# Patient Record
Sex: MALE | Race: WHITE | HISPANIC OR LATINO | URBAN - METROPOLITAN AREA
[De-identification: names, ages, dates, MRNs, and addresses within clinical notes are randomized per-mention and may not be internally consistent; named-entity substitution may affect disease eponyms.]

---

## 2021-04-25 ENCOUNTER — EMERGENCY (EMERGENCY)
Facility: HOSPITAL | Age: 66
LOS: 1 days | Discharge: HOME | End: 2021-04-25
Attending: EMERGENCY MEDICINE
Payer: SELF-PAY

## 2021-04-25 VITALS
OXYGEN SATURATION: 98 % | DIASTOLIC BLOOD PRESSURE: 70 MMHG | RESPIRATION RATE: 18 BRPM | SYSTOLIC BLOOD PRESSURE: 118 MMHG | TEMPERATURE: 97 F | HEART RATE: 75 BPM

## 2021-04-25 PROCEDURE — 99283 EMERGENCY DEPT VISIT LOW MDM: CPT

## 2021-04-25 PROCEDURE — 99284 EMERGENCY DEPT VISIT MOD MDM: CPT

## 2021-04-25 PROCEDURE — 82962 GLUCOSE BLOOD TEST: CPT

## 2021-04-25 PROCEDURE — 99053 MED SERV 10PM-8AM 24 HR FAC: CPT

## 2021-04-25 RX ORDER — SODIUM CHLORIDE 9 MG/ML
3 INJECTION INTRAMUSCULAR; INTRAVENOUS; SUBCUTANEOUS ONCE
Refills: 0 | Status: DISCONTINUED | OUTPATIENT
Start: 2021-04-25 | End: 2021-04-25

## 2021-04-25 NOTE — ED PROVIDER NOTE - CLINICAL SUMMARY MEDICAL DECISION MAKING FREE TEXT BOX
66 y/o male, asymptomatic and with no complaints, requesting only to eat. Will provide sources for shelter. 66 y/o male, asymptomatic and with no complaints, requesting only to eat. Will provide sources for shelter.  Pt is well appearing, has no new complaints and able to walk with normal gait. Pt is stable for discharge and follow up with medical doctor. Pt educated on care and need for follow up. Discussed anticipatory guidance and return precautions. Questions answered. I had a detailed discussion with the patient and/or guardian regarding the historical points, exam findings, and any diagnostic results supporting the discharge diagnosis.

## 2021-04-25 NOTE — ED ADULT NURSE NOTE - CHPI ED NUR TIMING2
Consent 1/Introductory Paragraph: The rationale for Mohs was explained to the patient and consent was obtained. The risks, benefits and alternatives to therapy were discussed in detail. Specifically, the risks of infection, scarring, bleeding, prolonged wound healing, incomplete removal, allergy to anesthesia, nerve injury and recurrence were addressed. Prior to the procedure, the treatment site was clearly identified and confirmed by the patient. All components of Universal Protocol/PAUSE Rule completed. none

## 2021-04-25 NOTE — ED PROVIDER NOTE - NSFOLLOWUPCLINICS_GEN_ALL_ED_FT
[facility]  Internal Medicine  [address] [place] [address]  Phone: [phone]  Fax: [phone]

## 2021-04-25 NOTE — ED PROVIDER NOTE - NSCAREINITIATED _GEN_ER
Problem: Oxygenation:  Goal: Maintain adequate oxygenation dependent on patient condition  Outcome: NOT MET     Problem: Bronchoconstriction:  Goal: Improve in air movement and diminished wheezing  Outcome: NOT MET     Problem: Ventilation Defect:  Goal: Ability to achieve and maintain unassisted ventilation or tolerate decreased levels of ventilator support  Outcome: MET      Patient extubated to nasal cannula today.     Given racemic epi x 2 for stridor post extubation and placed on cool aerosol.      Jp Root(Attending)

## 2021-04-25 NOTE — ED ADULT NURSE NOTE - OBJECTIVE STATEMENT
pt is  65 [place]/o generalized weakness and feeling cold.denies any nausea and vomiting.

## 2021-04-25 NOTE — ED PROVIDER NOTE - NSFOLLOWUPINSTRUCTIONS_ED_ALL_ED_FT
Return to ER if you have pain, fever, vomiting, feel depressed, anxious or unsafe. See contact information for detox facility. If you need any assistance for appointments please contact our Care Coordinator at [phone].

## 2021-04-25 NOTE — ED PROVIDER NOTE - PATIENT PORTAL LINK FT
You can access the FollowMyHealth Patient Portal offered by [facility] by registering at the following website: [email or web address]. By joining [employer]’s FollowMyHealth portal, you will also be able to view your health information using other applications (apps) compatible with our system.

## 2021-04-25 NOTE — ED PROVIDER NOTE - CONSTITUTIONAL, MLM
Appears disheveled, well appearing, awake, alert, oriented to person, place, time/situation and in no apparent distress. normal...

## 2022-05-07 ENCOUNTER — EMERGENCY (EMERGENCY)
Facility: HOSPITAL | Age: 67
LOS: 1 days | Discharge: HOME | End: 2022-05-07
Attending: EMERGENCY MEDICINE | Admitting: EMERGENCY MEDICINE
Payer: SELF-PAY

## 2022-05-07 VITALS
OXYGEN SATURATION: 98 % | WEIGHT: 160.06 LBS | RESPIRATION RATE: 16 BRPM | HEART RATE: 70 BPM | DIASTOLIC BLOOD PRESSURE: 88 MMHG | TEMPERATURE: 99 F | SYSTOLIC BLOOD PRESSURE: 142 MMHG

## 2022-05-07 PROCEDURE — 99284 EMERGENCY DEPT VISIT MOD MDM: CPT

## 2022-05-07 RX ORDER — IBUPROFEN 200 MG
600 TABLET ORAL ONCE
Refills: 0 | Status: COMPLETED | OUTPATIENT
Start: 2022-05-07 | End: 2022-05-07

## 2022-05-07 RX ORDER — ACETAMINOPHEN 500 MG
650 TABLET ORAL ONCE
Refills: 0 | Status: COMPLETED | OUTPATIENT
Start: 2022-05-07 | End: 2022-05-07

## 2022-05-07 RX ADMIN — Medication 650 MILLIGRAM(S): at 14:10

## 2022-05-07 RX ADMIN — Medication 600 MILLIGRAM(S): at 14:11

## 2022-05-07 NOTE — ED PROVIDER NOTE - OBJECTIVE STATEMENT
67 yo male pt, undomiciled, presents requesting food and stating he is hungry. Initially complaining of abd pain, nausea, vomiting but then changed complain.

## 2022-05-07 NOTE — ED ADULT TRIAGE NOTE - CHIEF COMPLAINT QUOTE
BIBEMS for left above the knee amputation site pain, pt states "it (left leg) was sawed off 5 months ago at [facility]" not able to explain further details. Site is warm , some sutures appear to still be in place unclear if they are intact.  Pt arrived with own wheelchair.

## 2022-05-07 NOTE — ED PROVIDER NOTE - PATIENT PORTAL LINK FT
You can access the FollowMyHealth Patient Portal offered by [facility] by registering at the following website: [email or web address]. By joining [employer]’s FollowMyHealth portal, you will also be able to view your health information using other applications (apps) compatible with our system.

## 2022-05-07 NOTE — ED BEHAVIORAL HEALTH NOTE - BEHAVIORAL HEALTH NOTE
SW was consulted to the ED by Provider to assist PT with shelter resources.  PT was provided with shelter and drop in center information.  Team made aware and SW made available for further assistance.

## 2022-05-07 NOTE — ED ADULT NURSE NOTE - CAS DISC TRANSFER ENTER DETAILS FT
Down 10lb since last seen. Started Qsymia 4 weeks ago. Decreased thoughts about food and hunger. Decreased urges to eat at night. Does feel more hungry during the day when normally she wouldn't be hungry all day. Eating more regular meals and snacks which is helping. Has eliminated soda. Experiencing some tingling, not bothersome. Insomnia since starting qsymia- can fall asleep but not stay asleep. Will try stopping the qsymia and switching to topiramate only- start at 50mg in the evening and increase to 75mg in the evening. She will try this for a couple of weeks. Could consider the 8mg phentermine (not controlled release like qsymia) if too hungry on topiramate alone.    wheelchair

## 2022-05-07 NOTE — ED ADULT NURSE NOTE - OBJECTIVE STATEMENT
Pt aox3 BIBEMS, Pt states left above the knee amputation site pain, pt states "it (left leg) was sawed off 5 months ago at [facility]" not able to explain further details. Site is warm , some sutures appear to still be in place unclear if they are intact.  Pt arrived with own wheelchair. denies fevers or drainage.

## 2022-05-07 NOTE — ED ADULT NURSE NOTE - NSIMPLEMENTINTERV_GEN_ALL_ED
Implemented All Fall Risk Interventions:  [place] to call system. Call bell, personal items and telephone within reach. Instruct patient to call for assistance. Room bathroom lighting operational. Non-slip footwear when patient is off stretcher. Physically safe environment: no spills, clutter or unnecessary equipment. Stretcher in lowest position, wheels locked, appropriate side rails in place. Provide visual cue, wrist band, yellow gown, etc. Monitor gait and stability. Monitor for mental status changes and reorient to person, place, and time. Review medications for side effects contributing to fall risk. Reinforce activity limits and safety measures with patient and family.

## 2022-05-07 NOTE — ED ADULT NURSE NOTE - CHIEF COMPLAINT QUOTE
BIBEMS for left above the knee amputation site pain, pt states "it (left leg) was sawed off 5 months ago at [facility]" not able to explain further details. Site is warm , some sutures appear to still be in place unclear if they are intact.  Pt arrived with own wheelchair.

## 2022-05-08 ENCOUNTER — EMERGENCY (EMERGENCY)
Facility: HOSPITAL | Age: 67
LOS: 1 days | Discharge: HOME | End: 2022-05-08
Attending: EMERGENCY MEDICINE | Admitting: EMERGENCY MEDICINE
Payer: SELF-PAY

## 2022-05-08 VITALS
HEIGHT: 67 IN | RESPIRATION RATE: 18 BRPM | DIASTOLIC BLOOD PRESSURE: 88 MMHG | WEIGHT: 139.99 LBS | HEART RATE: 77 BPM | TEMPERATURE: 98 F | OXYGEN SATURATION: 98 % | SYSTOLIC BLOOD PRESSURE: 130 MMHG

## 2022-05-08 VITALS
SYSTOLIC BLOOD PRESSURE: 94 MMHG | HEART RATE: 94 BPM | WEIGHT: 160.06 LBS | RESPIRATION RATE: 18 BRPM | OXYGEN SATURATION: 98 % | DIASTOLIC BLOOD PRESSURE: 63 MMHG | TEMPERATURE: 98 F

## 2022-05-08 DIAGNOSIS — R10.9 UNSPECIFIED ABDOMINAL PAIN: ICD-10-CM

## 2022-05-08 DIAGNOSIS — Z59.48 OTHER SPECIFIED LACK OF ADEQUATE FOOD: ICD-10-CM

## 2022-05-08 DIAGNOSIS — Z59.01 SHELTERED HOMELESSNESS: ICD-10-CM

## 2022-05-08 PROCEDURE — 99284 EMERGENCY DEPT VISIT MOD MDM: CPT

## 2022-05-08 SDOH — ECONOMIC STABILITY - FOOD INSECURITY: OTHER SPECIFIED LACK OF ADEQUATE FOOD: Z59.48

## 2022-05-08 SDOH — ECONOMIC STABILITY - HOUSING INSECURITY: SHELTERED HOMELESSNESS: Z59.01

## 2022-05-08 NOTE — ED ADULT NURSE NOTE - BEFAST ARM NUMBNESS
Met with patient and wife about diagnosis and discharge plan of care. Pt lives with spouse in 2 story home with bed and bath on 1 st floor. Has [employer], will get commode and shower chair at home. Pt lives approx 2 hours away in [place]. [facility]-will contact.     Addend note: faxed orders and noted to [facility] nurses-SOLITARIO No

## 2022-05-08 NOTE — ED PROVIDER NOTE - CLINICAL SUMMARY MEDICAL DECISION MAKING FREE TEXT BOX
Patient with leg amputation brought from [address] by EMS after he flagged down a  and requested transport to hospital for body pain, on presentation to ED patient states he needs food and a place to rest.

## 2022-05-08 NOTE — ED PROVIDER NOTE - PATIENT PORTAL LINK FT
You can access the FollowMyHealth Patient Portal offered by [facility] by registering at the following website: [email or web address]. By joining [employer]’s FollowMyHealth portal, you will also be able to view your health information using other applications (apps) compatible with our system.

## 2022-05-08 NOTE — ED PROVIDER NOTE - CLINICAL SUMMARY MEDICAL DECISION MAKING FREE TEXT BOX
provided food. medical screening examination shows no findings to suggest life threatening illnesses.

## 2022-05-08 NOTE — ED PROVIDER NOTE - PATIENT PORTAL LINK FT
You can access the FollowMyHealth Patient Portal offered by [facility] by registering at the following website: [email or web address]. By joining [employer]’s FollowMyHealth portal, you will also be able to view your health information using other applications (apps) compatible with our system.

## 2022-05-08 NOTE — ED ADULT NURSE NOTE - OBJECTIVE STATEMENT
pt a&ox3 BIBA c/o body pain for 2 weeks. was just DCed from here recently. denies trauma. has L. AKA, uses wheelchair for mobility. will continue to monitor.

## 2022-05-08 NOTE — ED PROVIDER NOTE - OBJECTIVE STATEMENT
67 yo male pt, undomiciled, presents requesting food and stating he is hungry. Initially complaining of malaise, body aches for several months. Old chart review shows prior visit yesterday for similar.

## 2022-05-08 NOTE — ED ADULT TRIAGE NOTE - CHIEF COMPLAINT QUOTE
Pt BIBEMS from street complaining of body aches x 2 months. PT with RAMIRO TURCIOS. Pt requesting food in triage.

## 2022-05-08 NOTE — ED ADULT TRIAGE NOTE - CHIEF COMPLAINT QUOTE
Pt BIBA c/o body pain for 2 wks. Pt D/C from this facility 2 hours ago. Pt denies any new injury/trauma. Pt has L AKA, uses wheelchair for mobility.

## 2022-05-08 NOTE — ED PROVIDER NOTE - OBJECTIVE STATEMENT
Patient with leg amputation brought from [address] by EMS after he flagged down a  and requested transport to hospital for body pain, on presentation to ED patient states he needs food and a place to rest.

## 2022-05-10 DIAGNOSIS — Z89.9 ACQUIRED ABSENCE OF LIMB, UNSPECIFIED: ICD-10-CM

## 2022-05-10 DIAGNOSIS — Z00.00 ENCOUNTER FOR GENERAL ADULT MEDICAL EXAMINATION WITHOUT ABNORMAL FINDINGS: ICD-10-CM

## 2022-05-10 DIAGNOSIS — R53.81 OTHER MALAISE: ICD-10-CM

## 2022-05-10 DIAGNOSIS — R53.1 WEAKNESS: ICD-10-CM

## 2022-05-10 DIAGNOSIS — R52 PAIN, UNSPECIFIED: ICD-10-CM

## 2022-05-14 ENCOUNTER — EMERGENCY (EMERGENCY)
Facility: HOSPITAL | Age: 67
LOS: 1 days | Discharge: HOME | End: 2022-05-14
Admitting: EMERGENCY MEDICINE
Payer: SELF-PAY

## 2022-05-14 VITALS
SYSTOLIC BLOOD PRESSURE: 100 MMHG | HEART RATE: 72 BPM | HEIGHT: 67 IN | OXYGEN SATURATION: 95 % | DIASTOLIC BLOOD PRESSURE: 65 MMHG | TEMPERATURE: 99 F | RESPIRATION RATE: 18 BRPM

## 2022-05-14 PROBLEM — Z89.619 ACQUIRED ABSENCE OF UNSPECIFIED LEG ABOVE KNEE: Chronic | Status: ACTIVE | Noted: 2022-05-08

## 2022-05-14 PROCEDURE — 99284 EMERGENCY DEPT VISIT MOD MDM: CPT

## 2022-05-14 PROCEDURE — 99053 MED SERV 10PM-8AM 24 HR FAC: CPT

## 2022-05-14 NOTE — ED PROVIDER NOTE - PATIENT PORTAL LINK FT
You can access the FollowMyHealth Patient Portal offered by [facility] by registering at the following website: [email or web address]. By joining [employer]’s FollowMyHealth portal, you will also be able to view your health information using other applications (apps) compatible with our system.

## 2022-05-14 NOTE — ED PROVIDER NOTE - CLINICAL SUMMARY MEDICAL DECISION MAKING FREE TEXT BOX
65 y/o M presents to ED c/o generalized body aches.  Pt well appearing, VSS, NAD.  Pt sleeping in treatment chair.  He is refusing medications offered and requesting a bed to lay down.  Pt does not endorse any other symptoms.  Symptoms are possible early viral syndrome vs malingering behavior.    Return precautions discussed.  Pt verbalized understanding and given the opportunity to ask questions.  Patient advised to follow up with primary care provider.

## 2022-05-14 NOTE — ED PROVIDER NOTE - CONDITION AT DISCHARGE:
acetaminophen-codeine (TYLENOL #3) 300-30 MG tablet      Last Written Prescription Date:  6/10/2019  Last Fill Quantity: 90 tablet,   # refills: 0  Last Office Visit: 4/1/2019  magda/ JIMMY Graves    Future Office visit:    Next 5 appointments (look out 90 days)    Jul 22, 2019  1:00 PM CDT  [facility] [address] [place] [phone]           Routing refill request to provider for review/approval because:  Drug not on the [facility], [facility] or  Health refill protocol or controlled substance     Improved

## 2022-05-14 NOTE — ED PROVIDER NOTE - PHYSICAL EXAMINATION
Constitutional:  Well appearing, awake, alert, oriented to person, place, time/situation and in no apparent distress  Head:  NC/AT, symmetric  ENMT: Airway patent  Eyes:  Clear bilaterally, pupils equal, round   Cardiac:  Normal rate  Respiratory:  Normal respiratory rate and effort  GI:   Abd soft, non-distended  MSK:  Atraumatic, FROM  Neuro:  Alert and oriented  Skin:  Skin normal color for race, warm, dry and intact.    Psych:  Normal mood and affect, no apparent risk to self or others.

## 2022-05-14 NOTE — ED PROVIDER NOTE - NSFOLLOWUPINSTRUCTIONS_ED_ALL_ED_FT
-PLEASE FOLLOW-UP WITH YOUR PRIMARY CARE DOCTOR IN 1-2 DAYS.  BRING ALL PAPERWORK FROM TODAY'S VISIT TO YOUR FOLLOW-UP VISIT.  IF YOU DO NOT HAVE A PRIMARY CARE DOCTOR PLEASE REFER TO THE OFFICE/CLINIC INFORMATION GIVEN ABOVE.  YOU MAY ALSO CALL [phone] AND ASK FOR MS. AMANDEEP HANANE.  SHE CAN HELP YOU MAKE A FOLLOW-UP APPOINTMENT.  HER HOURS ARE 9AM-5PM MONDAY - FRIDAY.  -TAKE OVER THE COUNTER TYLENOL 650MG BY MOUTH EVERY 4-6 HOURS AS NEEDED FOR PAIN.  DO NOT MIX WITH ALCOHOL OR OTHER PRESCRIPTION MEDICATIONS THAT ALREADY CONTAIN TYLENOL OR ACETAMINOPHEN.   -TAKE OVER THE COUNTER IBUPROFEN 400-600MG BY MOUTH EVERY 8 HOURS AS NEEDED FOR PAIN.  BE SURE TO TAKE WITH FOOD OR MILK AS THIS MEDICATION CAN CAUSE STOMACH IRRITATION.  -PLEASE RETURN TO THE EMERGENCY DEPARTMENT IMMEDIATELY OR CALL 911 FOR ANY HIGH FEVER, TROUBLE BREATHING, VOMITING, SEVERE PAIN, OR ANY OTHER CONCERNS.

## 2022-05-15 ENCOUNTER — EMERGENCY (EMERGENCY)
Facility: HOSPITAL | Age: 67
LOS: 1 days | Discharge: HOME | End: 2022-05-15
Admitting: EMERGENCY MEDICINE
Payer: SELF-PAY

## 2022-05-15 VITALS
HEIGHT: 67 IN | OXYGEN SATURATION: 97 % | HEART RATE: 78 BPM | DIASTOLIC BLOOD PRESSURE: 64 MMHG | TEMPERATURE: 98 F | SYSTOLIC BLOOD PRESSURE: 104 MMHG | WEIGHT: 139.99 LBS | RESPIRATION RATE: 16 BRPM

## 2022-05-15 VITALS
SYSTOLIC BLOOD PRESSURE: 118 MMHG | DIASTOLIC BLOOD PRESSURE: 77 MMHG | RESPIRATION RATE: 15 BRPM | HEIGHT: 67 IN | TEMPERATURE: 99 F | OXYGEN SATURATION: 98 % | WEIGHT: 147.05 LBS | HEART RATE: 78 BPM

## 2022-05-15 VITALS
RESPIRATION RATE: 18 BRPM | TEMPERATURE: 98 F | HEART RATE: 65 BPM | HEIGHT: 67 IN | DIASTOLIC BLOOD PRESSURE: 70 MMHG | OXYGEN SATURATION: 97 % | WEIGHT: 139.99 LBS | SYSTOLIC BLOOD PRESSURE: 102 MMHG

## 2022-05-15 DIAGNOSIS — M79.10 MYALGIA, UNSPECIFIED SITE: ICD-10-CM

## 2022-05-15 DIAGNOSIS — Z59.00 HOMELESSNESS UNSPECIFIED: ICD-10-CM

## 2022-05-15 PROCEDURE — 99282 EMERGENCY DEPT VISIT SF MDM: CPT

## 2022-05-15 PROCEDURE — 99283 EMERGENCY DEPT VISIT LOW MDM: CPT

## 2022-05-15 PROCEDURE — 99284 EMERGENCY DEPT VISIT MOD MDM: CPT

## 2022-05-15 PROCEDURE — 99053 MED SERV 10PM-8AM 24 HR FAC: CPT

## 2022-05-15 RX ORDER — ACETAMINOPHEN 500 MG
650 TABLET ORAL ONCE
Refills: 0 | Status: DISCONTINUED | OUTPATIENT
Start: 2022-05-15 | End: 2022-05-18

## 2022-05-15 SDOH — ECONOMIC STABILITY - HOUSING INSECURITY: HOMELESSNESS UNSPECIFIED: Z59.00

## 2022-05-15 NOTE — ED PROVIDER NOTE - CLINICAL SUMMARY MEDICAL DECISION MAKING FREE TEXT BOX
medical screening exam has been performed.  Pt with no acute trauma or emergencies noted and exam wnl.  hemodynamically stable and non toxic appearing, given food and medically stable for dc. f/u instructions have been provided

## 2022-05-15 NOTE — ED PROVIDER NOTE - PHYSICAL EXAMINATION
Gen - Unkempt M, NAD, comfortable and non-toxic appearing  Skin - warm, dry, intact   HEENT - AT/NC, no nasal discharge, airway patent, neck supple and FROM  MS - No acute or gross deformities noted to extremities. s/p L AKA, stump C/D, wheelchair bound   Neuro - AxOx3, normal speech
ROLLING WALKER

## 2022-05-15 NOTE — ED PROVIDER NOTE - PHYSICAL EXAMINATION
Gen - Unkempt M, NAD, comfortable and non-toxic appearing  Skin - warm, dry, intact   HEENT - AT/NC, no nasal discharge, airway patent, neck supple and FROM  MS - No acute or gross deformities noted to extremities. s/p L AKA, stump C/D, wheelchair bound   Neuro - AxOx3, normal speech

## 2022-05-15 NOTE — ED PROVIDER NOTE - OBJECTIVE STATEMENT
65 y/o M with PMH Of traumatic AKA presents returns to ED c/o myalgia x 5 years.  Pt states the symptoms are similar to the past and unchanged.  He is undomiciled and is requesting Tylenol and an opportunity to rest in the ED.

## 2022-05-15 NOTE — ED PROVIDER NOTE - OBJECTIVE STATEMENT
67 yo M with PMHx of L AKA, wheelchair bound, undomiciled, presenting c/o feeling tired due to lack of sleep. Seen here this morning for similar issues, and returns now asking for a bed to lay down.  Denies fever, chills, trauma, fall, CP, SOB, palpitations, N/V, HA, dizziness, focal weakness, change in urinary/bowel function, LOC, and malaise.

## 2022-05-15 NOTE — ED PROVIDER NOTE - PATIENT PORTAL LINK FT
You can access the FollowMyHealth Patient Portal offered by [facility] by registering at the following website: [email or web address]. By joining [employer]’s FollowMyHealth portal, you will also be able to view your health information using other applications (apps) compatible with our system.

## 2022-05-15 NOTE — ED PROVIDER NOTE - CLINICAL SUMMARY MEDICAL DECISION MAKING FREE TEXT BOX
67 y/o M presents to ED c/o chronic, unchanged, myalgia.  pt well appearing, afebrile, VSS.  Pt given Tylenol and food as requested.  Pt discharged.

## 2022-05-15 NOTE — ED ADULT TRIAGE NOTE - CHIEF COMPLAINT QUOTE
BIBA c/o myalgias x 5 months. seen yesterday at [facility] ED for same complaint. pt appears very comfortable. wheelchair at bedside

## 2022-05-15 NOTE — ED ADULT NURSE NOTE - CHIEF COMPLAINT QUOTE
BIBA c/o myalgias x 5 months. seen yesterday at [facility] ED for same complaint. pt appears very comfortable. wheelchair at bedside

## 2022-05-15 NOTE — ED PROVIDER NOTE - PATIENT PORTAL LINK FT
You can access the FollowMyHealth Patient Portal offered by [facility] by registering at the following website: [email or web address]. By joining [employer]’s FollowMyHealth portal, you will also be able to view your health information using other applications (apps) compatible with our system.

## 2022-05-15 NOTE — ED ADULT TRIAGE NOTE - SPO2 (%)
Called patient back.  She is planing to take a taxi tomorrow to her appointment.  For tonight she will try hydroxyzine  mg, and also use it throughout the day if needed for anxiety.   97

## 2022-05-15 NOTE — ED ADULT TRIAGE NOTE - CHIEF COMPLAINT QUOTE
BIBA for body aches x several months. was seen this morning for same complaint. pt appears very comfortable. wheelchair at bedside

## 2022-05-15 NOTE — ED PROVIDER NOTE - NSFOLLOWUPINSTRUCTIONS_ED_ALL_ED_FT
Follow up with your primary care doctor or clinics listed below if you do not have a doctor,    [address] [address]  To make an appointment, call [phone]    [facility]  Address: [facility] [address] [place] [id number]  Appointment Center: [phone] ([phone])     [facility] is a good referral line for crisis and substance abuse help.  [facility] has drop in programs all over the city.    Return to the ER for Emergencies.  Return immediately for any new or worsening symptoms or any new concerns

## 2022-05-15 NOTE — ED ADULT NURSE NOTE - NSIMPLEMENTINTERV_GEN_ALL_ED
Implemented All Fall with Harm Risk Interventions:  [place] to call system. Call bell, personal items and telephone within reach. Instruct patient to call for assistance. Room bathroom lighting operational. Non-slip footwear when patient is off stretcher. Physically safe environment: no spills, clutter or unnecessary equipment. Stretcher in lowest position, wheels locked, appropriate side rails in place. Provide visual cue, wrist band, yellow gown, etc. Monitor gait and stability. Monitor for mental status changes and reorient to person, place, and time. Review medications for side effects contributing to fall risk. Reinforce activity limits and safety measures with patient and family. Provide visual clues: red socks.

## 2022-05-15 NOTE — ED PROVIDER NOTE - NSFOLLOWUPINSTRUCTIONS_ED_ALL_ED_FT
Follow up with your primary care doctor or clinics listed below if you do not have a doctor,    [address] [address]  To make an appointment, call [phone]    [facility]  Address: [facility] [address] [place] [id number]  Appointment Center: [phone] ([phone])     [facility] is a good referral line for crisis and substance abuse help.  [facility] has drop in programs all over the city.    Return to the ER for Emergencies.  Return immediately for any new or worsening symptoms or any new concerns

## 2022-05-15 NOTE — ED PROVIDER NOTE - PHYSICAL EXAMINATION
Constitutional:  Well appearing, awake, alert, oriented to person, place, time/situation and in no apparent distress  Head:  NC/AT, symmetric  ENMT: Airway patent  Eyes:  Clear bilaterally, pupils equal, round   Cardiac:  Normal rate  Respiratory:  Normal respiratory rate and effort  GI:   Abd soft, non-distended  MSK:  Atraumatic, FROM, + AKA  Neuro:  Alert and oriented  Skin:  Skin normal color for race, warm, dry and intact.    Psych:  Normal mood and affect, no apparent risk to self or others.

## 2022-05-15 NOTE — ED PROVIDER NOTE - CLINICAL SUMMARY MEDICAL DECISION MAKING FREE TEXT BOX
medical screening exam has been performed.  Pt with no acute trauma or emergencies noted and exam wnl.  pt repeatedly called ambulance to be dropped off for a place to sleep. no acute medical complaints, stable for dc

## 2022-05-15 NOTE — ED PROVIDER NOTE - OBJECTIVE STATEMENT
67 yo M with PMHx of L AKA, wheelchair bound, undomiciled, presenting c/o feeling tired due to lack of sleep. Seen here this morning and 2 hours ago for similar issues, and returns again asking for a place to sleep. No acute medical complaints. Denies fever, chills, trauma, fall, CP, SOB, palpitations, N/V, HA, dizziness, focal weakness, change in urinary/bowel function, LOC, and malaise.

## 2022-05-15 NOTE — ED PROVIDER NOTE - PATIENT PORTAL LINK FT
You can access the FollowMyHealth Patient Portal offered by [facility] by registering at the following website: [email or web address]. By joining [employer]’s FollowMyHealth portal, you will also be able to view your health information using other applications (apps) compatible with our system.

## 2022-05-15 NOTE — ED PROVIDER NOTE - NSFOLLOWUPINSTRUCTIONS_ED_ALL_ED_FT
-PLEASE FOLLOW-UP WITH YOUR PRIMARY CARE DOCTOR IN 1-2 DAYS.  BRING ALL PAPERWORK FROM TODAY'S VISIT TO YOUR FOLLOW-UP VISIT.  IF YOU DO NOT HAVE A PRIMARY CARE DOCTOR PLEASE REFER TO THE OFFICE/CLINIC INFORMATION GIVEN ABOVE.  YOU MAY ALSO CALL [phone] AND ASK FOR MS. AMANDEEP HANANE.  SHE CAN HELP YOU MAKE A FOLLOW-UP APPOINTMENT.  HER HOURS ARE 9AM-5PM MONDAY - FRIDAY.  -TAKE OVER THE COUNTER TYLENOL 650MG BY MOUTH EVERY 4-6 HOURS AS NEEDED FOR PAIN.  DO NOT MIX WITH ALCOHOL OR OTHER PRESCRIPTION MEDICATIONS THAT ALREADY CONTAIN TYLENOL OR ACETAMINOPHEN.   -TAKE OVER THE COUNTER IBUPROFEN 400-600MG BY MOUTH EVERY 8 HOURS AS NEEDED FOR PAIN.  BE SURE TO TAKE WITH FOOD OR MILK AS THIS MEDICATION CAN CAUSE STOMACH IRRITATION.  -PLEASE RETURN TO THE EMERGENCY DEPARTMENT IMMEDIATELY OR CALL 911 FOR ANY HIGH FEVER, TROUBLE BREATHING, VOMITING, SEVERE PAIN, OR ANY OTHER CONCERNS.

## 2022-05-15 NOTE — ED ADULT TRIAGE NOTE - CHIEF COMPLAINT QUOTE
Pt brought in by EMS for complaints of body aches unchanged from when he was discharged here about 2 hours ago.

## 2022-05-17 DIAGNOSIS — Z99.3 DEPENDENCE ON WHEELCHAIR: ICD-10-CM

## 2022-05-17 DIAGNOSIS — Z89.611 ACQUIRED ABSENCE OF RIGHT LEG ABOVE KNEE: ICD-10-CM

## 2022-05-17 DIAGNOSIS — Z00.00 ENCOUNTER FOR GENERAL ADULT MEDICAL EXAMINATION WITHOUT ABNORMAL FINDINGS: ICD-10-CM

## 2022-05-17 DIAGNOSIS — G89.29 OTHER CHRONIC PAIN: ICD-10-CM

## 2022-05-17 DIAGNOSIS — G47.00 INSOMNIA, UNSPECIFIED: ICD-10-CM

## 2022-05-17 DIAGNOSIS — Z89.612 ACQUIRED ABSENCE OF LEFT LEG ABOVE KNEE: ICD-10-CM

## 2022-05-17 DIAGNOSIS — M79.10 MYALGIA, UNSPECIFIED SITE: ICD-10-CM

## 2023-01-26 VITALS
WEIGHT: 136.91 LBS | DIASTOLIC BLOOD PRESSURE: 74 MMHG | OXYGEN SATURATION: 96 % | HEIGHT: 64 IN | SYSTOLIC BLOOD PRESSURE: 121 MMHG | TEMPERATURE: 98 F | HEART RATE: 81 BPM | RESPIRATION RATE: 16 BRPM

## 2023-01-26 PROCEDURE — 99285 EMERGENCY DEPT VISIT HI MDM: CPT

## 2023-01-26 NOTE — ED ADULT TRIAGE NOTE - CHIEF COMPLAINT QUOTE
pt bibems, from shelter, c/o right arm pain, right leg pain s/p fall x2 today.  +hs, -loc, -bt use. pt was seen and d/c from [place] today after found on floor in shelter. pt with above knee amputation.

## 2023-01-26 NOTE — ED ADULT NURSE NOTE - OBJECTIVE STATEMENT
pt bibems, from shelter, c/o right arm pain, right leg pain s/p fall x2 today.  +hs, -loc, -bt use. pt was seen and d/c from [place] today after found on floor in shelter. pt with above knee amputation.    triage note as above, nad, covering up and asking for blanket while being placed on bed, lt AKA,  PE unremarkable,  denies known trauma,  neuro exam non-focal, provided with warm blanket for comfort, asleep shortly after, awaiting provider eval.

## 2023-01-26 NOTE — ED ADULT NURSE NOTE - CHIEF COMPLAINT QUOTE
pt bibems, from shelter, c/o right arm pain, right leg pain s/p fall x2 today.  +hs, -loc, -bt use. pt was seen and d/c from [place] today after found on floor in shelter. pt with above knee amputation.

## 2023-01-27 ENCOUNTER — INPATIENT (INPATIENT)
Facility: HOSPITAL | Age: 68
LOS: 0 days | Discharge: HOME | DRG: 93 | End: 2023-01-28
Attending: INTERNAL MEDICINE | Admitting: INTERNAL MEDICINE
Payer: COMMERCIAL

## 2023-01-27 DIAGNOSIS — R29.6 REPEATED FALLS: ICD-10-CM

## 2023-01-27 DIAGNOSIS — D64.9 ANEMIA, UNSPECIFIED: ICD-10-CM

## 2023-01-27 DIAGNOSIS — Z89.612 ACQUIRED ABSENCE OF LEFT LEG ABOVE KNEE: Chronic | ICD-10-CM

## 2023-01-27 DIAGNOSIS — Z89.612 ACQUIRED ABSENCE OF LEFT LEG ABOVE KNEE: ICD-10-CM

## 2023-01-27 DIAGNOSIS — Z29.9 ENCOUNTER FOR PROPHYLACTIC MEASURES, UNSPECIFIED: ICD-10-CM

## 2023-01-27 LAB
ALBUMIN SERPL ELPH-MCNC: 3.3 G/DL — SIGNIFICANT CHANGE UP (ref 3.3–5)
ALP SERPL-CCNC: 124 U/L — HIGH (ref 40–120)
ALT FLD-CCNC: 15 U/L — SIGNIFICANT CHANGE UP (ref 10–45)
ANION GAP SERPL CALC-SCNC: 9 MMOL/L — SIGNIFICANT CHANGE UP (ref 5–17)
ANISOCYTOSIS BLD QL: SIGNIFICANT CHANGE UP
APPEARANCE UR: CLEAR — SIGNIFICANT CHANGE UP
AST SERPL-CCNC: 25 U/L — SIGNIFICANT CHANGE UP (ref 10–40)
BASOPHILS # BLD AUTO: 0 K/UL — SIGNIFICANT CHANGE UP (ref 0–0.2)
BASOPHILS NFR BLD AUTO: 0 % — SIGNIFICANT CHANGE UP (ref 0–2)
BILIRUB SERPL-MCNC: 0.5 MG/DL — SIGNIFICANT CHANGE UP (ref 0.2–1.2)
BILIRUB UR-MCNC: NEGATIVE — SIGNIFICANT CHANGE UP
BUN SERPL-MCNC: 16 MG/DL — SIGNIFICANT CHANGE UP (ref 7–23)
CALCIUM SERPL-MCNC: 9 MG/DL — SIGNIFICANT CHANGE UP (ref 8.4–10.5)
CHLORIDE SERPL-SCNC: 100 MMOL/L — SIGNIFICANT CHANGE UP (ref 96–108)
CO2 SERPL-SCNC: 26 MMOL/L — SIGNIFICANT CHANGE UP (ref 22–31)
COLOR SPEC: YELLOW — SIGNIFICANT CHANGE UP
CREAT SERPL-MCNC: 0.55 MG/DL — SIGNIFICANT CHANGE UP (ref 0.5–1.3)
DIFF PNL FLD: NEGATIVE — SIGNIFICANT CHANGE UP
EGFR: 109 ML/MIN/1.73M2 — SIGNIFICANT CHANGE UP
EOSINOPHIL # BLD AUTO: 0.93 K/UL — HIGH (ref 0–0.5)
EOSINOPHIL NFR BLD AUTO: 12 % — HIGH (ref 0–6)
FLUAV AG NPH QL: SIGNIFICANT CHANGE UP
FLUBV AG NPH QL: SIGNIFICANT CHANGE UP
GLUCOSE SERPL-MCNC: 136 MG/DL — HIGH (ref 70–99)
GLUCOSE UR QL: NEGATIVE — SIGNIFICANT CHANGE UP
HCT VFR BLD CALC: 32.5 % — LOW (ref 39–50)
HGB BLD-MCNC: 10.5 G/DL — LOW (ref 13–17)
HYPOCHROMIA BLD QL: SLIGHT — SIGNIFICANT CHANGE UP
KETONES UR-MCNC: NEGATIVE — SIGNIFICANT CHANGE UP
LEUKOCYTE ESTERASE UR-ACNC: NEGATIVE — SIGNIFICANT CHANGE UP
LG PLATELETS BLD QL AUTO: SLIGHT — SIGNIFICANT CHANGE UP
LYMPHOCYTES # BLD AUTO: 1.87 K/UL — SIGNIFICANT CHANGE UP (ref 1–3.3)
LYMPHOCYTES # BLD AUTO: 24 % — SIGNIFICANT CHANGE UP (ref 13–44)
MACROCYTES BLD QL: SLIGHT — SIGNIFICANT CHANGE UP
MANUAL SMEAR VERIFICATION: SIGNIFICANT CHANGE UP
MCHC RBC-ENTMCNC: 25.9 PG — LOW (ref 27–34)
MCHC RBC-ENTMCNC: 32.3 GM/DL — SIGNIFICANT CHANGE UP (ref 32–36)
MCV RBC AUTO: 80.2 FL — SIGNIFICANT CHANGE UP (ref 80–100)
MICROCYTES BLD QL: SLIGHT — SIGNIFICANT CHANGE UP
MONOCYTES # BLD AUTO: 0.93 K/UL — HIGH (ref 0–0.9)
MONOCYTES NFR BLD AUTO: 12 % — SIGNIFICANT CHANGE UP (ref 2–14)
NEUTROPHILS # BLD AUTO: 4.05 K/UL — SIGNIFICANT CHANGE UP (ref 1.8–7.4)
NEUTROPHILS NFR BLD AUTO: 52 % — SIGNIFICANT CHANGE UP (ref 43–77)
NITRITE UR-MCNC: NEGATIVE — SIGNIFICANT CHANGE UP
NRBC # BLD: 0 /100 — SIGNIFICANT CHANGE UP (ref 0–0)
NRBC # BLD: SIGNIFICANT CHANGE UP /100 WBCS (ref 0–0)
OVALOCYTES BLD QL SMEAR: SLIGHT — SIGNIFICANT CHANGE UP
PH UR: 6.5 — SIGNIFICANT CHANGE UP (ref 5–8)
PLAT MORPH BLD: ABNORMAL
PLATELET # BLD AUTO: 229 K/UL — SIGNIFICANT CHANGE UP (ref 150–400)
POLYCHROMASIA BLD QL SMEAR: SLIGHT — SIGNIFICANT CHANGE UP
POTASSIUM SERPL-MCNC: 4.4 MMOL/L — SIGNIFICANT CHANGE UP (ref 3.5–5.3)
POTASSIUM SERPL-SCNC: 4.4 MMOL/L — SIGNIFICANT CHANGE UP (ref 3.5–5.3)
PROT SERPL-MCNC: 7.3 G/DL — SIGNIFICANT CHANGE UP (ref 6–8.3)
PROT UR-MCNC: NEGATIVE MG/DL — SIGNIFICANT CHANGE UP
RBC # BLD: 4.05 M/UL — LOW (ref 4.2–5.8)
RBC # FLD: 20.6 % — HIGH (ref 10.3–14.5)
RBC BLD AUTO: ABNORMAL
RSV RNA NPH QL NAA+NON-PROBE: SIGNIFICANT CHANGE UP
SARS-COV-2 RNA SPEC QL NAA+PROBE: SIGNIFICANT CHANGE UP
SODIUM SERPL-SCNC: 135 MMOL/L — SIGNIFICANT CHANGE UP (ref 135–145)
SP GR SPEC: 1.02 — SIGNIFICANT CHANGE UP (ref 1–1.03)
SPHEROCYTES BLD QL SMEAR: SLIGHT — SIGNIFICANT CHANGE UP
UROBILINOGEN FLD QL: 0.2 E.U./DL — SIGNIFICANT CHANGE UP
WBC # BLD: 7.79 K/UL — SIGNIFICANT CHANGE UP (ref 3.8–10.5)
WBC # FLD AUTO: 7.79 K/UL — SIGNIFICANT CHANGE UP (ref 3.8–10.5)

## 2023-01-27 PROCEDURE — 73030 X-RAY EXAM OF SHOULDER: CPT | Mod: 26

## 2023-01-27 PROCEDURE — 73564 X-RAY EXAM KNEE 4 OR MORE: CPT | Mod: 26,RT

## 2023-01-27 PROCEDURE — 73502 X-RAY EXAM HIP UNI 2-3 VIEWS: CPT | Mod: 26,RT

## 2023-01-27 PROCEDURE — 70450 CT HEAD/BRAIN W/O DYE: CPT | Mod: 26,MA

## 2023-01-27 PROCEDURE — 99222 1ST HOSP IP/OBS MODERATE 55: CPT | Mod: GC

## 2023-01-27 RX ORDER — ACETAMINOPHEN 500 MG
650 TABLET ORAL ONCE
Refills: 0 | Status: COMPLETED | OUTPATIENT
Start: 2023-01-27 | End: 2023-01-27

## 2023-01-27 RX ORDER — LANOLIN ALCOHOL/MO/W.PET/CERES
3 CREAM (GRAM) TOPICAL AT BEDTIME
Refills: 0 | Status: DISCONTINUED | OUTPATIENT
Start: 2023-01-27 | End: 2023-01-28

## 2023-01-27 RX ORDER — INFLUENZA VIRUS VACCINE 15; 15; 15; 15 UG/.5ML; UG/.5ML; UG/.5ML; UG/.5ML
0.7 SUSPENSION INTRAMUSCULAR ONCE
Refills: 0 | Status: DISCONTINUED | OUTPATIENT
Start: 2023-01-27 | End: 2023-01-28

## 2023-01-27 RX ORDER — ONDANSETRON 8 MG/1
4 TABLET, FILM COATED ORAL EVERY 8 HOURS
Refills: 0 | Status: DISCONTINUED | OUTPATIENT
Start: 2023-01-27 | End: 2023-01-28

## 2023-01-27 RX ORDER — ACETAMINOPHEN 500 MG
650 TABLET ORAL EVERY 6 HOURS
Refills: 0 | Status: DISCONTINUED | OUTPATIENT
Start: 2023-01-27 | End: 2023-01-28

## 2023-01-27 RX ADMIN — Medication 650 MILLIGRAM(S): at 19:36

## 2023-01-27 RX ADMIN — Medication 650 MILLIGRAM(S): at 01:15

## 2023-01-27 RX ADMIN — Medication 650 MILLIGRAM(S): at 20:36

## 2023-01-27 RX ADMIN — Medication 650 MILLIGRAM(S): at 02:00

## 2023-01-27 RX ADMIN — Medication 3 MILLIGRAM(S): at 22:57

## 2023-01-27 NOTE — H&P ADULT - PROBLEM SELECTOR PLAN 1
Presenting to ED after fall at shelter. Reports frequent falls when transferring from wheelchair. CT head negative, XR L shoulder negative for acute fracture, XR R hip and R knee negative for acute fracture.  - PT consulted, recommend YVONNE placement  - monitor mental status Presenting to ED after fall at shelter, likely mechanical. Reports frequent falls when transferring from wheelchair. CT head negative, XR L shoulder negative for acute fracture, XR R hip and R knee negative for acute fracture.  - PT consulted, recommend YVONNE placement  - f/u ECG  - monitor mental status Presenting to ED after fall at shelter when transferring from wheelchair to the toilet, likely mechanical. Reports frequent falls when transferring from wheelchair. CT head negative, XR L shoulder negative for acute fracture, XR R hip and R knee negative for acute fracture.  - PT consulted, recommend YVONNE placement  - ECG w bradycardia 58 bpm, normal intervals and no ST changes  - monitor mental status

## 2023-01-27 NOTE — PATIENT PROFILE ADULT - FUNCTIONAL ASSESSMENT - BASIC MOBILITY 6.
Not able to assess (calculate score using AMPAC averaging method) 1-calculated by average/Not able to assess (calculate score using [facility] averaging method)

## 2023-01-27 NOTE — ED PROVIDER NOTE - PATIENT PORTAL LINK FT
You can access the FollowMyHealth Patient Portal offered by [facility] by registering at the following website: [email or web address]. By joining [employer]’s FollowMyHealth portal, you will also be able to view your health information using other applications (apps) compatible with our system.

## 2023-01-27 NOTE — ED PROVIDER NOTE - CLINICAL SUMMARY MEDICAL DECISION MAKING FREE TEXT BOX
s/p fall from wheelchair. states hit head. no focal neuro deficits. c/o pain to right knee - however FROM, no swelling, no bony deformity. cspine cleared by nexus criteria  -check xrays to r/o fx  -CT to r/o ich  -tylenol

## 2023-01-27 NOTE — ED ADULT NURSE REASSESSMENT NOTE - NS ED NURSE REASSESS COMMENT FT1
pt. informed of planned d/c, states needs t/p, reports that he lives at Shelter at [facility] to arrange t/p home, pt. aware, with understanding verbalized

## 2023-01-27 NOTE — H&P ADULT - NSHPPHYSICALEXAM_GEN_ALL_CORE
PHYSICAL EXAM:  Constitutional: Somnolent, resting comfortably in bed; NAD  Head: NC/AT  Eyes: PERRL, EOMI, anicteric sclera  ENT: no nasal discharge; uvula midline; MMM  Neck: supple; no JVD or thyromegaly  Respiratory: CTA B/L; no W/R/R, no retractions  Cardiac: +S1/S2; RRR; no M/R/G  Gastrointestinal: abdomen soft, NTND; +BSx4  Extremities: WWP, no clubbing or cyanosis; no peripheral edema. L AKA noted, no tenderness to palpation at stump site  Musculoskeletal: NROM x4; no joint swelling, tenderness or erythema  Vascular: 2+ radial b/l, DP/PT pulses RLE  Dermatologic: skin warm, dry and intact; no rashes, wounds, or scars  Neurologic: AAOx3; CNII-XII grossly intact; no focal deficits  Psychiatric: somnolent, minimally cooperative with interview and exam

## 2023-01-27 NOTE — PHYSICAL THERAPY INITIAL EVALUATION ADULT - PHYSICAL ASSIST/NONPHYSICAL ASSIST: SUPINE/SIT, REHAB EVAL
unable to initiate sitting despite HOB elevated ~45 degrees/verbal cues/nonverbal cues (demo/gestures)/1 person assist

## 2023-01-27 NOTE — PHYSICAL THERAPY INITIAL EVALUATION ADULT - IMPAIRMENTS CONTRIBUTING IMPAIRED BED MOBILITY, REHAB EVAL
**patient complaining of (L)shoulder pain to touch and with movement; ASA Graff ([facility]) made aware/impaired balance/impaired coordination/decreased flexibility/pain/impaired postural control/decreased ROM/decreased strength

## 2023-01-27 NOTE — PHYSICAL THERAPY INITIAL EVALUATION ADULT - PERTINENT HX OF CURRENT PROBLEM, REHAB EVAL
67M denies PMH, c/o fall. pt has L AKA and is wheelchair bound. fell out of wheelchair at shelter. c/o pain to right leg. states he hit his head. no LOC. no vomiting. Please refer to ED Provider Note for details.

## 2023-01-27 NOTE — PHYSICAL THERAPY INITIAL EVALUATION ADULT - GENERAL OBSERVATIONS, REHAB EVAL
PT IE completed. Chart reviewed. Patient without complaints pf pain at rest, agreeable to PT. Patient received semi-supine, unkempt, +urine soiled pants, +IV hep jarett, ASA Graff ([facility]) cleared patient for treatment.

## 2023-01-27 NOTE — ED PROVIDER NOTE - NSFOLLOWUPINSTRUCTIONS_ED_ALL_ED_FT
Knee Pain    WHAT YOU NEED TO KNOW:    Knee pain may start suddenly, or it may be a long-term problem. You may have pain on the side, front, or back of your knee. You may have knee stiffness and swelling. You may hear popping sounds or feel like your knee is giving way or locking up as you walk. You may feel pain when you sit, stand, walk, or climb up and down stairs. Knee pain can be caused by conditions such as obesity, inflammation, or strains or tears in ligaments or tendons.     DISCHARGE INSTRUCTIONS:    Return to the emergency department if:   •Your pain is worse, even after treatment.     •You cannot bend or straighten your leg completely.     •The swelling around your knee does not go down even with treatment.    •Your knee is painful and hot to the touch.     Contact your healthcare provider if:   •You have questions or concerns about your condition or care.     Medicines: You may need any of the following:   •NSAIDs help decrease swelling and pain or fever. This medicine is available with or without a doctor's order. NSAIDs can cause stomach bleeding or kidney problems in certain people. If you take blood thinner medicine, always ask your healthcare provider if NSAIDs are safe for you. Always read the medicine label and follow directions.    •Acetaminophen decreases pain and fever. It is available without a doctor's order. Ask how much to take and how often to take it. Follow directions. Read the labels of all other medicines you are using to see if they also contain acetaminophen, or ask your doctor or pharmacist. Acetaminophen can cause liver damage if not taken correctly.    •Prescription pain medicine may be given. Ask your healthcare provider how to take this medicine safely. Some prescription pain medicines contain acetaminophen. Do not take other medicines that contain acetaminophen without talking to your healthcare provider. Too much acetaminophen may cause liver damage. Prescription pain medicine may cause constipation. Ask your healthcare provider how to prevent or treat constipation.     •Take your medicine as directed. Contact your healthcare provider if you think your medicine is not helping or if you have side effects. Tell your provider if you are allergic to any medicine. Keep a list of the medicines, vitamins, and herbs you take. Include the amounts, and when and why you take them. Bring the list or the pill bottles to follow-up visits. Carry your medicine list with you in case of an emergency.    What you can do to manage your symptoms:   •Rest your knee so it can heal. Limit activities that increase your pain. Do low-impact exercises, such as walking or swimming.     •Apply ice to help reduce swelling and pain. Use an ice pack, or put crushed ice in a plastic bag. Cover it with a towel before you apply it to your knee. Apply ice for 15 to 20 minutes every hour, or as directed.    •Apply compression to help reduce swelling. Use a brace or bandage only as directed.    •Elevate your knee to help decrease pain and swelling. Elevate your knee while you are sitting or lying down. Prop your leg on pillows to keep your knee above the level of your heart.    •Prevent your knee from moving as directed. Your healthcare provider may put on a cast or splint. You may need to wear a leg brace to stabilize your knee. A leg brace can be adjusted to increase your range of motion as your knee heals.    What you can do to prevent knee pain:   •Maintain a healthy weight. Extra weight increases your risk for knee pain. Ask your healthcare provider how much you should weigh. He or she can help you create a safe weight loss plan if you need to lose weight.    •Exercise or train properly. Use the correct equipment for sports. Wear shoes that provide good support. Check your posture often as you exercise, play sports, or train for an event. This can help prevent stress and strain on your knees. Rest between sessions so you do not overwork your knees.    Follow up with your healthcare provider within 24 hours or as directed: You may need follow-up treatments, such as steroid injections to decrease pain. Write down your questions so you remember to ask them during your visits.

## 2023-01-27 NOTE — H&P ADULT - NSHPLABSRESULTS_GEN_ALL_CORE
LABS:                         10.5   7.79  )-----------( 229      ( 27 Jan 2023 09:26 )             32.5     01-27    135  |  100  |  16  ----------------------------<  136<H>  4.4   |  26  |  0.55    Ca    9.0      27 Jan 2023 09:26    TPro  7.3  /  Alb  3.3  /  TBili  0.5  /  DBili  x   /  AST  25  /  ALT  15  /  AlkPhos  124<H>  01-27                  RADIOLOGY, EKG & ADDITIONAL TESTS:

## 2023-01-27 NOTE — PHYSICAL THERAPY INITIAL EVALUATION ADULT - IMPAIRMENTS FOUND, PT EVAL
endurance/arousal, attention, and cognition/gait, locomotion, and balance/muscle strength/posture/ROM

## 2023-01-27 NOTE — ED ADULT NURSE REASSESSMENT NOTE - NS ED NURSE REASSESS COMMENT FT1
pt. back from radiology, nad at present, medicated as noted, asking for apple jacks cereal, ssa looking, pt. aware

## 2023-01-27 NOTE — ED PROVIDER NOTE - OBJECTIVE STATEMENT
67M denies PMH, c/o fall. pt has L AKA and is wheelchair bound. fell out of wheelchair at shelter. c/o pain to right leg. states he hit his head. no LOC. no vomiting.

## 2023-01-27 NOTE — ED ADULT NURSE REASSESSMENT NOTE - NS ED NURSE REASSESS COMMENT FT1
Received report on patient. Patient pending social work consult for transport back to shelter where wheelchair is (LLE amputation).  at bedside. AAOX4, NAD.

## 2023-01-27 NOTE — PHYSICAL THERAPY INITIAL EVALUATION ADULT - MANUAL MUSCLE TESTING RESULTS, REHAB EVAL
Grossly assessed with functional movement, bilateral UE greater than or equal to 3+/5 although increased B/L elbow flexion and 10-digit extensor tone noted with active movement; (R)LE able to lift against gravity with movement

## 2023-01-27 NOTE — H&P ADULT - ATTENDING COMMENTS
Shelter - coordinator Tika [phone] reports multiple falls over past few weeks while transferring from bed to wheel chair and wheel chair to toilet., they feel that he is not safe to return to Shelter     He does not want to go to [facility]   PT to Evaluate on 1/28 as he did not participate with them on 1/27     Medically patient has no acute issues     Discussed with PUSHPA Souza and ALBERTO Mitchell on the day of admission , They will start discharge planning for [facility] or Shelter after PT eval

## 2023-01-27 NOTE — PHYSICAL THERAPY INITIAL EVALUATION ADULT - PHYSICAL ASSIST/NONPHYSICAL ASSIST: SIT/SUPINE, REHAB EVAL
poor eccentric trunk control; *increased assist for (R)LE onto bed surface/verbal cues/nonverbal cues (demo/gestures)/1 person assist

## 2023-01-27 NOTE — PHYSICAL THERAPY INITIAL EVALUATION ADULT - LEVEL OF INDEPENDENCE: BED TO CHAIR, REHAB EVAL
Therapist deferred 2/2 poor static sitting balance and postural control with inability to assist at this time

## 2023-01-27 NOTE — PHYSICAL THERAPY INITIAL EVALUATION ADULT - PHYSICAL ASSIST/NONPHYSICAL ASSIST: SCOOT/BRIDGE, REHAB EVAL
scooting to EOB in sitting with flex-pad assist/verbal cues/nonverbal cues (demo/gestures)/1 person assist

## 2023-01-27 NOTE — H&P ADULT - PROBLEM SELECTOR PLAN 3
no relief w questran. uses 3-7 imodium per day, chronic. tried low fodmap without relief. recent LFT nml. xifaxan helps short term only then recurs. Prior hx: cholecystectomy 12/2020, chr diarrhea persists, nonbloody, pt does not want colonoscopy unless sx progress as previous difficulty with bowel prep 5y ago. has tried gluten free and low fodmap diets. hx fatty liver. social etoh. alt slightly elevated, other lft nml, extensive labs unremarkable '19. reports prior colonoscopy '15. Patient unable to provide history of need for AKA. L AKA stump without erythema, fluctuance or tenderness to palpation.

## 2023-01-27 NOTE — PATIENT PROFILE ADULT - FALL HARM RISK - HARM RISK INTERVENTIONS

## 2023-01-27 NOTE — H&P ADULT - PROBLEM SELECTOR PLAN 4
F: tolerating PO, no IVF  E: replenish K<4, Mg<2  N: Regular    VTE Prophylaxis: Lovenox 40 Q24H  GI: not needed  C: Full Code  D: F

## 2023-01-27 NOTE — PHYSICAL THERAPY INITIAL EVALUATION ADULT - ADDITIONAL COMMENTS
Patient is a poor historian at this time but states "he was trying to get from his wheelchair to the toilet" and fell".

## 2023-01-27 NOTE — ED PROVIDER NOTE - PROGRESS NOTE DETAILS
no fractures on xrays. pt states has a wheelchair at his shelter, will arrange transportation [facility] - Assumed care of pt from pm team, pending transport home, however on re-eval/ eval by SW, pt with reportedly multiple recent falls.  PT evaluated pt, recommending [facility].  Will admit

## 2023-01-27 NOTE — H&P ADULT - PROBLEM SELECTOR PLAN 2
Admission H/H 10.5/32.5. Unknown etiology, limited patient medical history available.  - f/u iron panel  - transfusion goal Hb >7  - active T&S

## 2023-01-27 NOTE — H&P ADULT - HISTORY OF PRESENT ILLNESS
67 year old male with no stated past medical history and L AKA performed 2 years ago presenting to the ED after a fall at his shelter. l. c/o pain to right leg. states he hit his head. no LOC. no vomiting. 67 year old male with no stated past medical history and L AKA performed 2 years ago presenting to the ED after a fall out of his wheelchair at his shelter. States he hit his head, denies loss of consciousness or tongue biting. Also complaining of R knee pain. Patient denies fevers, chills, headaches, nausea, vomiting, diarrhea, urinary symptoms, leg swelling, tingling of extremities, or other symptoms.    ED course:  Vitals: T 98 67 year old male with no stated past medical history and L AKA performed 2 years ago presenting to the ED after a fall out of his wheelchair at his shelter. States he hit his head, denies loss of consciousness or tongue biting. Also complaining of R knee pain. Patient denies fevers, chills, headaches, nausea, vomiting, diarrhea, urinary symptoms, leg swelling, tingling of extremities, or other symptoms. Has multiple hospital bands on both wrists but is unable to state where else he has been admitted recently.    ED course:  Vitals: T 98.3, HR 81, /74, rr 16, SpO2 96% on RA  Labs: H/H 10.5/32.5, AlkP 124, COVID negative, Influenza A/B negative, RSV negative  CT head with no acute intracranial hemorrhage or calvarial fracture  XR R knee with no acute fracture or dislocation, degenerative changes, osteophytosis and joint space narrowing  XR L shoulder with subacromial enthesophyte, no acute fracture or dislocation, greater humeral tuberosity peritendinitis  XR R hip no acute fracture or dislocation, L femortal subtrochanteric resection, lower lumber spine degenerative changes, colonic and rectal feces/impaction    PT consulted in the ED and determined that the patient was not a safe discharge due to frequent falls, recommends subacute rehab placement    Admitted to Medicine for subacute rehab placement. 67 year old male with no stated past medical history and L AKA performed 2 years ago presenting to the ED after a fall out of his wheelchair at his shelter. States he hit his head, denies loss of consciousness or tongue biting. Also complaining of R knee pain. Patient denies fevers, chills, headaches, nausea, vomiting, diarrhea, urinary symptoms, leg swelling, tingling of extremities, or other symptoms. Has multiple hospital bands on both wrists but is unable to state where else he has been admitted recently.    ED course:  Vitals: T 98.3, HR 81, /74, rr 16, SpO2 96% on RA  Labs: H/H 10.5/32.5, AlkP 124, COVID negative, Influenza A/B negative, RSV negative  CT head with no acute intracranial hemorrhage or calvarial fracture  XR R knee with no acute fracture or dislocation, degenerative changes, osteophytosis and joint space narrowing  XR L shoulder with subacromial enthesophyte, no acute fracture or dislocation, greater humeral tuberosity peritendinitis  XR R hip no acute fracture or dislocation, L femortal subtrochanteric resection, lower lumber spine degenerative changes, colonic and rectal feces/impaction    Intervention: Tylenol 650 mg po x1    PT consulted in the ED and determined that the patient was not a safe discharge due to frequent falls, recommends subacute rehab placement    Admitted to Medicine for subacute rehab placement. 67 year old male with no stated past medical history and L AKA performed 2 years ago presenting to the ED after a fall out of his wheelchair at his shelter. States he hit his head, denies loss of consciousness or tongue biting. Was attempting to transfer from the wheelchair to the toilet at the time. Also complaining of R knee pain. Patient denies fevers, chills, headaches, nausea, vomiting, diarrhea, urinary symptoms, leg swelling, tingling of extremities, or other symptoms. Has multiple hospital bands on both wrists but is unable to state where else he has been admitted recently.    ED course:  Vitals: T 98.3, HR 81, /74, rr 16, SpO2 96% on RA  Labs: H/H 10.5/32.5, AlkP 124, COVID negative, Influenza A/B negative, RSV negative  ECG w bradycardia 58 bpm, normal intervals and no ST changes  CT head with no acute intracranial hemorrhage or calvarial fracture  XR R knee with no acute fracture or dislocation, degenerative changes, osteophytosis and joint space narrowing  XR L shoulder with subacromial enthesophyte, no acute fracture or dislocation, greater humeral tuberosity peritendinitis  XR R hip no acute fracture or dislocation, L femortal subtrochanteric resection, lower lumber spine degenerative changes, colonic and rectal feces/impaction    Intervention: Tylenol 650 mg po x1    PT consulted in the ED and determined that the patient was not a safe discharge due to frequent falls, recommends subacute rehab placement    Admitted to Medicine for subacute rehab placement.

## 2023-01-27 NOTE — PHYSICAL THERAPY INITIAL EVALUATION ADULT - BALANCE TRAINING, PT EVAL
By 1 week (02/03/23): patient will be independent with all static and dynamic sitting balance therex; standing balance to be assessed

## 2023-01-27 NOTE — ED ADULT NURSE REASSESSMENT NOTE - NS ED NURSE REASSESS COMMENT FT1
pt. resting quietly, nad or change, resps. even/unlabored, assessment on-going, awaiting final dispo.

## 2023-01-27 NOTE — PHYSICAL THERAPY INITIAL EVALUATION ADULT - THERAPY FREQUENCY, PT EVAL
Patient educated on frequency of inpatient physical therapy at [facility], patient verbalized understanding./2-3x/week

## 2023-01-27 NOTE — H&P ADULT - ASSESSMENT
67 year old with unknown past medical history and L AKA (2021) presenting from shelter after a fall with head strike, evaluated by PT and recommended for subacute rehab placement. 67 year old male with unknown past medical history and L AKA (2021) presenting from shelter after a fall with head strike, evaluated by PT and recommended for subacute rehab placement.

## 2023-01-28 ENCOUNTER — TRANSCRIPTION ENCOUNTER (OUTPATIENT)
Age: 68
End: 2023-01-28

## 2023-01-28 VITALS
HEART RATE: 62 BPM | RESPIRATION RATE: 169 BRPM | TEMPERATURE: 97 F | DIASTOLIC BLOOD PRESSURE: 58 MMHG | SYSTOLIC BLOOD PRESSURE: 116 MMHG

## 2023-01-28 LAB
ANION GAP SERPL CALC-SCNC: 6 MMOL/L — SIGNIFICANT CHANGE UP (ref 5–17)
BASOPHILS # BLD AUTO: 0.05 K/UL — SIGNIFICANT CHANGE UP (ref 0–0.2)
BASOPHILS NFR BLD AUTO: 0.8 % — SIGNIFICANT CHANGE UP (ref 0–2)
BUN SERPL-MCNC: 21 MG/DL — SIGNIFICANT CHANGE UP (ref 7–23)
CALCIUM SERPL-MCNC: 8.6 MG/DL — SIGNIFICANT CHANGE UP (ref 8.4–10.5)
CHLORIDE SERPL-SCNC: 104 MMOL/L — SIGNIFICANT CHANGE UP (ref 96–108)
CO2 SERPL-SCNC: 27 MMOL/L — SIGNIFICANT CHANGE UP (ref 22–31)
CREAT SERPL-MCNC: 0.65 MG/DL — SIGNIFICANT CHANGE UP (ref 0.5–1.3)
EGFR: 104 ML/MIN/1.73M2 — SIGNIFICANT CHANGE UP
EOSINOPHIL # BLD AUTO: 0.43 K/UL — SIGNIFICANT CHANGE UP (ref 0–0.5)
EOSINOPHIL NFR BLD AUTO: 7 % — HIGH (ref 0–6)
FERRITIN SERPL-MCNC: 14 NG/ML — LOW (ref 30–400)
GLUCOSE SERPL-MCNC: 89 MG/DL — SIGNIFICANT CHANGE UP (ref 70–99)
HCT VFR BLD CALC: 31.5 % — LOW (ref 39–50)
HCV AB S/CO SERPL IA: 0.04 S/CO — SIGNIFICANT CHANGE UP
HCV AB SERPL-IMP: SIGNIFICANT CHANGE UP
HGB BLD-MCNC: 9.9 G/DL — LOW (ref 13–17)
IMM GRANULOCYTES NFR BLD AUTO: 0.3 % — SIGNIFICANT CHANGE UP (ref 0–0.9)
IRON SATN MFR SERPL: 12 % — LOW (ref 16–55)
IRON SATN MFR SERPL: 41 UG/DL — LOW (ref 45–165)
LYMPHOCYTES # BLD AUTO: 1.39 K/UL — SIGNIFICANT CHANGE UP (ref 1–3.3)
LYMPHOCYTES # BLD AUTO: 22.7 % — SIGNIFICANT CHANGE UP (ref 13–44)
MAGNESIUM SERPL-MCNC: 1.9 MG/DL — SIGNIFICANT CHANGE UP (ref 1.6–2.6)
MCHC RBC-ENTMCNC: 25.5 PG — LOW (ref 27–34)
MCHC RBC-ENTMCNC: 31.4 GM/DL — LOW (ref 32–36)
MCV RBC AUTO: 81.2 FL — SIGNIFICANT CHANGE UP (ref 80–100)
MONOCYTES # BLD AUTO: 0.82 K/UL — SIGNIFICANT CHANGE UP (ref 0–0.9)
MONOCYTES NFR BLD AUTO: 13.4 % — SIGNIFICANT CHANGE UP (ref 2–14)
NEUTROPHILS # BLD AUTO: 3.42 K/UL — SIGNIFICANT CHANGE UP (ref 1.8–7.4)
NEUTROPHILS NFR BLD AUTO: 55.8 % — SIGNIFICANT CHANGE UP (ref 43–77)
NRBC # BLD: 0 /100 WBCS — SIGNIFICANT CHANGE UP (ref 0–0)
PHOSPHATE SERPL-MCNC: 4 MG/DL — SIGNIFICANT CHANGE UP (ref 2.5–4.5)
PLATELET # BLD AUTO: 227 K/UL — SIGNIFICANT CHANGE UP (ref 150–400)
POTASSIUM SERPL-MCNC: 4.3 MMOL/L — SIGNIFICANT CHANGE UP (ref 3.5–5.3)
POTASSIUM SERPL-SCNC: 4.3 MMOL/L — SIGNIFICANT CHANGE UP (ref 3.5–5.3)
RBC # BLD: 3.88 M/UL — LOW (ref 4.2–5.8)
RBC # FLD: 20.5 % — HIGH (ref 10.3–14.5)
SODIUM SERPL-SCNC: 137 MMOL/L — SIGNIFICANT CHANGE UP (ref 135–145)
TIBC SERPL-MCNC: 351 UG/DL — SIGNIFICANT CHANGE UP (ref 220–430)
UIBC SERPL-MCNC: 310 UG/DL — SIGNIFICANT CHANGE UP (ref 110–370)
WBC # BLD: 6.03 K/UL — SIGNIFICANT CHANGE UP (ref 3.8–10.5)
WBC # FLD AUTO: 6.03 K/UL — SIGNIFICANT CHANGE UP (ref 3.8–10.5)

## 2023-01-28 PROCEDURE — 36415 COLL VENOUS BLD VENIPUNCTURE: CPT

## 2023-01-28 PROCEDURE — 83735 ASSAY OF MAGNESIUM: CPT

## 2023-01-28 PROCEDURE — 99238 HOSP IP/OBS DSCHRG MGMT 30/<: CPT | Mod: GC

## 2023-01-28 PROCEDURE — 84100 ASSAY OF PHOSPHORUS: CPT

## 2023-01-28 PROCEDURE — 97161 PT EVAL LOW COMPLEX 20 MIN: CPT

## 2023-01-28 PROCEDURE — 85027 COMPLETE CBC AUTOMATED: CPT

## 2023-01-28 PROCEDURE — 85025 COMPLETE CBC W/AUTO DIFF WBC: CPT

## 2023-01-28 PROCEDURE — 73564 X-RAY EXAM KNEE 4 OR MORE: CPT

## 2023-01-28 PROCEDURE — 83550 IRON BINDING TEST: CPT

## 2023-01-28 PROCEDURE — 73502 X-RAY EXAM HIP UNI 2-3 VIEWS: CPT

## 2023-01-28 PROCEDURE — 81003 URINALYSIS AUTO W/O SCOPE: CPT

## 2023-01-28 PROCEDURE — 80048 BASIC METABOLIC PNL TOTAL CA: CPT

## 2023-01-28 PROCEDURE — 99285 EMERGENCY DEPT VISIT HI MDM: CPT | Mod: 25

## 2023-01-28 PROCEDURE — 82728 ASSAY OF FERRITIN: CPT

## 2023-01-28 PROCEDURE — 83540 ASSAY OF IRON: CPT

## 2023-01-28 PROCEDURE — 86803 HEPATITIS C AB TEST: CPT

## 2023-01-28 PROCEDURE — 70450 CT HEAD/BRAIN W/O DYE: CPT | Mod: MA

## 2023-01-28 PROCEDURE — 80053 COMPREHEN METABOLIC PANEL: CPT

## 2023-01-28 PROCEDURE — 87637 SARSCOV2&INF A&B&RSV AMP PRB: CPT

## 2023-01-28 PROCEDURE — 73030 X-RAY EXAM OF SHOULDER: CPT

## 2023-01-28 RX ORDER — ENOXAPARIN SODIUM 100 MG/ML
40 INJECTION SUBCUTANEOUS EVERY 24 HOURS
Refills: 0 | Status: DISCONTINUED | OUTPATIENT
Start: 2023-01-28 | End: 2023-01-28

## 2023-01-28 RX ORDER — POLYETHYLENE GLYCOL 3350 17 G/17G
17 POWDER, FOR SOLUTION ORAL EVERY 12 HOURS
Refills: 0 | Status: DISCONTINUED | OUTPATIENT
Start: 2023-01-28 | End: 2023-01-28

## 2023-01-28 RX ORDER — SENNA PLUS 8.6 MG/1
2 TABLET ORAL AT BEDTIME
Refills: 0 | Status: DISCONTINUED | OUTPATIENT
Start: 2023-01-28 | End: 2023-01-28

## 2023-01-28 RX ADMIN — Medication 650 MILLIGRAM(S): at 11:24

## 2023-01-28 RX ADMIN — ENOXAPARIN SODIUM 40 MILLIGRAM(S): 100 INJECTION SUBCUTANEOUS at 09:18

## 2023-01-28 RX ADMIN — POLYETHYLENE GLYCOL 3350 17 GRAM(S): 17 POWDER, FOR SOLUTION ORAL at 09:18

## 2023-01-28 RX ADMIN — Medication 650 MILLIGRAM(S): at 03:33

## 2023-01-28 RX ADMIN — Medication 650 MILLIGRAM(S): at 12:19

## 2023-01-28 RX ADMIN — Medication 650 MILLIGRAM(S): at 04:00

## 2023-01-28 NOTE — DISCHARGE NOTE NURSING/CASE MANAGEMENT/SOCIAL WORK - PATIENT PORTAL LINK FT
You can access the FollowMyHealth Patient Portal offered by [facility] by registering at the following website: [email or web address]. By joining [employer]’s FollowMyHealth portal, you will also be able to view your health information using other applications (apps) compatible with our system.

## 2023-01-28 NOTE — CONSULT NOTE ADULT - ASSESSMENT
per Internal Medicine    67 year old male with unknown past medical history and L AKA (2021) presenting from shelter after a fall with head strike, evaluated by PT and recommended for subacute rehab placement.    Problem/Plan - 1:  ·  Problem: Frequent falls.   ·  Plan: Presenting to ED after fall at shelter when transferring from wheelchair to the toilet, likely mechanical. Reports frequent falls when transferring from wheelchair. CT head negative, XR L shoulder negative for acute fracture, XR R hip and R knee negative for acute fracture.  - PT consulted, recommend [facility] placement  - ECG w bradycardia 58 bpm, normal intervals and no ST changes  - monitor mental status.    Problem/Plan - 2:  ·  Problem: Mild anemia.   ·  Plan: Admission H/H 10.5/32.5. Unknown etiology, limited patient medical history available.  - f/u iron panel  - transfusion goal Hb >7  - active T&S.    Problem/Plan - 3:  ·  Problem: S/P AKA (above knee amputation), left.   ·  Plan: Patient unable to provide history of need for AKA. L AKA stump without erythema, fluctuance or tenderness to palpation.    Problem/Plan - 4:  ·  Problem: Prophylactic measure.   ·  Plan: F: tolerating PO, no IVF  E: replenish K<4, Mg<2  N: Regular    VTE Prophylaxis: Lovenox 40 Q24H  GI: not needed  C: Full Code  D: RMF.

## 2023-01-28 NOTE — DISCHARGE NOTE PROVIDER - NSDCCPCAREPLAN_GEN_ALL_CORE_FT
PRINCIPAL DISCHARGE DIAGNOSIS  Diagnosis: Fall  Assessment and Plan of Treatment: You presented after falling and hitting your head. Head imaging did not reveal any acute abnormalities. After working with physical therapy, it was recommended that you be discharged to a subacute rehabilitation facility. You refused to go to a such a facility. The risks and dangers of that refusal were explained to you. You will return to shelter  -please use assistance when ambulating at [facility]  If you fall and injury yourself again please call 911 or go to your nearest emergency room

## 2023-01-28 NOTE — DISCHARGE NOTE PROVIDER - CARE PROVIDER_API CALL
Sangita Rosen)  Internal Medicine  [address] [place] [address]  Phone: [phone]  Fax: [phone]  Follow Up Time: 1 week

## 2023-01-28 NOTE — DISCHARGE NOTE PROVIDER - HOSPITAL COURSE
#Discharge: do not delete    67 year old male with pmhx L AKA (2021) presenting from shelter after a fall.    #Frequent falls.   Presenting to ED after fall at shelter when transferring from wheelchair to the toilet, likely mechanical. No focal neurologic deficits. Reports frequent falls when transferring from wheelchair. CT head negative, XR L shoulder negative for acute fracture, XR R hip and R knee negative for acute fracture.  - PT consulted, recommend [facility] placement, however patient refusing [facility]NE. Dangers of refusing [facility]ONNE discussed with patient, patient voiced understanding.   Discussed with social work, will return to shelter    Discharged to: shelter    .  VITAL SIGNS:  T(C): 36.2 (01-28-23 @ 12:13), Max: 36.7 (01-27-23 @ 21:54)  T(F): 97.2 (01-28-23 @ 12:13), Max: 98 (01-27-23 @ 21:54)  HR: 62 (01-28-23 @ 12:13) (57 - 62)  BP: 116/58 (01-28-23 @ 12:13) (98/65 - 116/58)  BP(mean): --  RR: 169 (01-28-23 @ 12:13) (16 - 169)  SpO2: 97% (01-28-23 @ 05:38) (97% - 98%)  Wt(kg): --    PHYSICAL EXAM:    Constitutional: resting comfortably in bed; NAD  Eyes: anicteric sclera  ENT: MMM  Respiratory: CTA B/L; no W/R/R, no retractions  Cardiac: +S1/S2; RRR; no M/R/G;  Gastrointestinal: soft, NT/ND; no rebound or guarding;  Extremities: left AKA; no edema, or ecchymosis  Neurologic: AAOx3; CNII-XII grossly intact; no focal deficits  Psychiatric: affect and characteristics of appearance, verbalizations, behaviors are appropriate     67 year old male with pmhx L AKA (2021) presenting from shelter after a fall.    #Frequent falls.   Presenting to ED after fall at shelter when transferring from wheelchair to the toilet, likely mechanical. No focal neurologic deficits. Reports frequent falls when transferring from wheelchair. CT head negative, XR L shoulder negative for acute fracture, XR R hip and R knee negative for acute fracture.  - PT consulted, recommend [facility] placement, however patient refusing Y[facility]NE. Dangers of refusing YVONNE discussed with patient, patient voiced understanding.   Discussed with social work, will return to shelter    Discharged to: shelter  .  VITAL SIGNS:  T(C): 36.2 (01-28-23 @ 12:13), Max: 36.7 (01-27-23 @ 21:54)  T(F): 97.2 (01-28-23 @ 12:13), Max: 98 (01-27-23 @ 21:54)  HR: 62 (01-28-23 @ 12:13) (57 - 62)  BP: 116/58 (01-28-23 @ 12:13) (98/65 - 116/58)  BP(mean): --  RR: 169 (01-28-23 @ 12:13) (16 - 169)  SpO2: 97% (01-28-23 @ 05:38) (97% - 98%)  Wt(kg): --    PHYSICAL EXAM:    Constitutional: resting comfortably in bed; NAD  Eyes: anicteric sclera  ENT: MMM  Respiratory: CTA B/L; no W/R/R, no retractions  Cardiac: +S1/S2; RRR; no M/R/G;  Gastrointestinal: soft, NT/ND; no rebound or guarding;  Extremities: left AKA; no edema, or ecchymosis  Neurologic: AAOx3; CNII-XII grossly intact; no focal deficits  Psychiatric: affect and characteristics of appearance, verbalizations, behaviors are appropriate    ******************************************************  ATTENDING ATTESTATION  Patient seen and discussed with resident team on the day of discharge.     67 year old man with hx of left AKA , presenting from shelter after a mechanical fall. CT head in the ED showed no acute intracranial process. XR left shoulder , right hip and right knee without fracture. Patient strongly prefers returning to shelter ; does not want to go to [facility] . Has capacity to refuse YVONNE.   Found to have iron deficiency anemia. Hgb stable. without any signs of brisk bleed. Advised patient to follow up outpatient for age appropriate cancer screening (including colonoscopy)     I was physically present for the evaluation and management services provided. I spent >25 minutes on patient care and discharge planning
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
## 2023-01-30 PROBLEM — Z00.00 ENCOUNTER FOR PREVENTIVE HEALTH EXAMINATION: Status: ACTIVE | Noted: 2023-01-30

## 2023-02-08 DIAGNOSIS — D50.9 IRON DEFICIENCY ANEMIA, UNSPECIFIED: ICD-10-CM

## 2023-02-08 DIAGNOSIS — Z89.612 ACQUIRED ABSENCE OF LEFT LEG ABOVE KNEE: ICD-10-CM

## 2023-02-08 DIAGNOSIS — Z99.3 DEPENDENCE ON WHEELCHAIR: ICD-10-CM

## 2023-02-08 DIAGNOSIS — Z20.822 CONTACT WITH AND (SUSPECTED) EXPOSURE TO COVID-19: ICD-10-CM

## 2023-02-08 DIAGNOSIS — F17.200 NICOTINE DEPENDENCE, UNSPECIFIED, UNCOMPLICATED: ICD-10-CM

## 2023-02-08 DIAGNOSIS — R29.6 REPEATED FALLS: ICD-10-CM

## 2023-02-27 NOTE — ED PROVIDER NOTE - IV ALTEPLASE INCLUSION HIDDEN
Well , 18 Months Old  Well-child exams are recommended visits with a health care provider to track your child's growth and development at certain ages. This sheet tells you what to expect during this visit.  Recommended immunizations  Hepatitis B vaccine. The third dose of a 3-dose series should be given at age 6-18 months. The third dose should be given at least 16 weeks after the first dose and at least 8 weeks after the second dose.  Diphtheria and tetanus toxoids and acellular pertussis (DTaP) vaccine. The fourth dose of a 5-dose series should be given at age 15-18 months. The fourth dose may be given 6 months or later after the third dose.  Haemophilus influenzae type b (Hib) vaccine. Your child may get doses of this vaccine if needed to catch up on missed doses, or if he or she has certain high-risk conditions.  Pneumococcal conjugate (PCV13) vaccine. Your child may get the final dose of this vaccine at this time if he or she:  Was given 3 doses before his or her first birthday.  Is at high risk for certain conditions.  Is on a delayed vaccine schedule in which the first dose was given at age 7 months or later.  Inactivated poliovirus vaccine. The third dose of a 4-dose series should be given at age 6-18 months. The third dose should be given at least 4 weeks after the second dose.  Influenza vaccine (flu shot). Starting at age 6 months, your child should be given the flu shot every year. Children between the ages of 6 months and 8 years who get the flu shot for the first time should get a second dose at least 4 weeks after the first dose. After that, only a single yearly (annual) dose is recommended.  Your child may get doses of the following vaccines if needed to catch up on missed doses:  Measles, mumps, and rubella (MMR) vaccine.  Varicella vaccine.  Hepatitis A vaccine. A 2-dose series of this vaccine should be given at age 12-23 months. The second dose should be given 6-18 months after the  "first dose. If your child has received only one dose of the vaccine by age 24 months, he or she should get a second dose 6-18 months after the first dose.  Meningococcal conjugate vaccine. Children who have certain high-risk conditions, are present during an outbreak, or are traveling to a country with a high rate of meningitis should get this vaccine.  Your child may receive vaccines as individual doses or as more than one vaccine together in one shot (combination vaccines). Talk with your child's health care provider about the risks and benefits of combination vaccines.  Testing  Vision  Your child's eyes will be assessed for normal structure (anatomy) and function (physiology). Your child may have more vision tests done depending on his or her risk factors.  Other tests    Your child's health care provider will screen your child for growth (developmental) problems and autism spectrum disorder (ASD).  Your child's health care provider may recommend checking blood pressure or screening for low red blood cell count (anemia), lead poisoning, or tuberculosis (TB). This depends on your child's risk factors.  General instructions  Parenting tips  Praise your child's good behavior by giving your child your attention.  Spend some one-on-one time with your child daily. Vary activities and keep activities short.  Set consistent limits. Keep rules for your child clear, short, and simple.  Provide your child with choices throughout the day.  When giving your child instructions (not choices), avoid asking yes and no questions (\"Do you want a bath?\"). Instead, give clear instructions (\"Time for a bath.\").  Recognize that your child has a limited ability to understand consequences at this age.  Interrupt your child's inappropriate behavior and show him or her what to do instead. You can also remove your child from the situation and have him or her do a more appropriate activity.  Avoid shouting at or spanking your child.  If " "your child cries to get what he or she wants, wait until your child briefly calms down before you give him or her the item or activity. Also, model the words that your child should use (for example, \"cookie please\" or \"climb up\").  Avoid situations or activities that may cause your child to have a temper tantrum, such as shopping trips.  Oral health    Brush your child's teeth after meals and before bedtime. Use a small amount of non-fluoride toothpaste.  Take your child to a dentist to discuss oral health.  Give fluoride supplements or apply fluoride varnish to your child's teeth as told by your child's health care provider.  Provide all beverages in a cup and not in a bottle. Doing this helps to prevent tooth decay.  If your child uses a pacifier, try to stop giving it your child when he or she is awake.  Sleep  At this age, children typically sleep 12 or more hours a day.  Your child may start taking one nap a day in the afternoon. Let your child's morning nap naturally fade from your child's routine.  Keep naptime and bedtime routines consistent.  Have your child sleep in his or her own sleep space.  What's next?  Your next visit should take place when your child is 24 months old.  Summary  Your child may receive immunizations based on the immunization schedule your health care provider recommends.  Your child's health care provider may recommend testing blood pressure or screening for anemia, lead poisoning, or tuberculosis (TB). This depends on your child's risk factors.  When giving your child instructions (not choices), avoid asking yes and no questions (\"Do you want a bath?\"). Instead, give clear instructions (\"Time for a bath.\").  Take your child to a dentist to discuss oral health.  Keep naptime and bedtime routines consistent.  This information is not intended to replace advice given to you by your health care provider. Make sure you discuss any questions you have with your health care " provider.  Document Released: 01/07/2008 Document Revised: 04/07/2020 Document Reviewed: 09/13/2019  Elsevier Patient Education © 2020 [employer]     show

## 2023-05-16 ENCOUNTER — EMERGENCY (EMERGENCY)
Facility: HOSPITAL | Age: 68
LOS: 1 days | Discharge: HOME | End: 2023-05-16
Attending: EMERGENCY MEDICINE
Payer: MEDICAID

## 2023-05-16 VITALS
HEIGHT: 68 IN | TEMPERATURE: 98 F | RESPIRATION RATE: 18 BRPM | HEART RATE: 89 BPM | WEIGHT: 130.07 LBS | DIASTOLIC BLOOD PRESSURE: 65 MMHG | SYSTOLIC BLOOD PRESSURE: 125 MMHG | OXYGEN SATURATION: 97 %

## 2023-05-16 DIAGNOSIS — Z89.612 ACQUIRED ABSENCE OF LEFT LEG ABOVE KNEE: Chronic | ICD-10-CM

## 2023-05-16 LAB
ALBUMIN SERPL ELPH-MCNC: 3 G/DL — LOW (ref 3.3–5)
ALP SERPL-CCNC: 99 U/L — SIGNIFICANT CHANGE UP (ref 40–120)
ALT FLD-CCNC: 9 U/L — LOW (ref 10–45)
ANION GAP SERPL CALC-SCNC: 12 MMOL/L — SIGNIFICANT CHANGE UP (ref 5–17)
APAP SERPL-MCNC: <15 UG/ML — SIGNIFICANT CHANGE UP (ref 10–30)
AST SERPL-CCNC: 17 U/L — SIGNIFICANT CHANGE UP (ref 10–40)
BASOPHILS # BLD AUTO: 0.02 K/UL — SIGNIFICANT CHANGE UP (ref 0–0.2)
BASOPHILS NFR BLD AUTO: 0.3 % — SIGNIFICANT CHANGE UP (ref 0–2)
BILIRUB SERPL-MCNC: 0.1 MG/DL — LOW (ref 0.2–1.2)
BUN SERPL-MCNC: 23 MG/DL — SIGNIFICANT CHANGE UP (ref 7–23)
CALCIUM SERPL-MCNC: 8.5 MG/DL — SIGNIFICANT CHANGE UP (ref 8.4–10.5)
CHLORIDE SERPL-SCNC: 103 MMOL/L — SIGNIFICANT CHANGE UP (ref 96–108)
CO2 SERPL-SCNC: 25 MMOL/L — SIGNIFICANT CHANGE UP (ref 22–31)
CREAT SERPL-MCNC: 0.7 MG/DL — SIGNIFICANT CHANGE UP (ref 0.5–1.3)
EGFR: 102 ML/MIN/1.73M2 — SIGNIFICANT CHANGE UP
EOSINOPHIL # BLD AUTO: 0.23 K/UL — SIGNIFICANT CHANGE UP (ref 0–0.5)
EOSINOPHIL NFR BLD AUTO: 3.2 % — SIGNIFICANT CHANGE UP (ref 0–6)
ETHANOL SERPL-MCNC: <10 MG/DL — SIGNIFICANT CHANGE UP (ref 0–10)
GLUCOSE SERPL-MCNC: 99 MG/DL — SIGNIFICANT CHANGE UP (ref 70–99)
HCT VFR BLD CALC: 32.4 % — LOW (ref 39–50)
HGB BLD-MCNC: 10.4 G/DL — LOW (ref 13–17)
IMM GRANULOCYTES NFR BLD AUTO: 0.6 % — SIGNIFICANT CHANGE UP (ref 0–0.9)
LYMPHOCYTES # BLD AUTO: 1.14 K/UL — SIGNIFICANT CHANGE UP (ref 1–3.3)
LYMPHOCYTES # BLD AUTO: 15.7 % — SIGNIFICANT CHANGE UP (ref 13–44)
MCHC RBC-ENTMCNC: 25.9 PG — LOW (ref 27–34)
MCHC RBC-ENTMCNC: 32.1 GM/DL — SIGNIFICANT CHANGE UP (ref 32–36)
MCV RBC AUTO: 80.6 FL — SIGNIFICANT CHANGE UP (ref 80–100)
MONOCYTES # BLD AUTO: 0.69 K/UL — SIGNIFICANT CHANGE UP (ref 0–0.9)
MONOCYTES NFR BLD AUTO: 9.5 % — SIGNIFICANT CHANGE UP (ref 2–14)
NEUTROPHILS # BLD AUTO: 5.12 K/UL — SIGNIFICANT CHANGE UP (ref 1.8–7.4)
NEUTROPHILS NFR BLD AUTO: 70.7 % — SIGNIFICANT CHANGE UP (ref 43–77)
NRBC # BLD: 0 /100 WBCS — SIGNIFICANT CHANGE UP (ref 0–0)
PLATELET # BLD AUTO: 238 K/UL — SIGNIFICANT CHANGE UP (ref 150–400)
POTASSIUM SERPL-MCNC: 4.2 MMOL/L — SIGNIFICANT CHANGE UP (ref 3.5–5.3)
POTASSIUM SERPL-SCNC: 4.2 MMOL/L — SIGNIFICANT CHANGE UP (ref 3.5–5.3)
PROT SERPL-MCNC: 6.2 G/DL — SIGNIFICANT CHANGE UP (ref 6–8.3)
RAPID RVP RESULT: SIGNIFICANT CHANGE UP
RBC # BLD: 4.02 M/UL — LOW (ref 4.2–5.8)
RBC # FLD: 18.4 % — HIGH (ref 10.3–14.5)
SALICYLATES SERPL-MCNC: <2 MG/DL — LOW (ref 15–30)
SARS-COV-2 RNA SPEC QL NAA+PROBE: SIGNIFICANT CHANGE UP
SODIUM SERPL-SCNC: 140 MMOL/L — SIGNIFICANT CHANGE UP (ref 135–145)
TSH SERPL-MCNC: 3.34 UIU/ML — SIGNIFICANT CHANGE UP (ref 0.27–4.2)
WBC # BLD: 7.24 K/UL — SIGNIFICANT CHANGE UP (ref 3.8–10.5)
WBC # FLD AUTO: 7.24 K/UL — SIGNIFICANT CHANGE UP (ref 3.8–10.5)

## 2023-05-16 PROCEDURE — 99285 EMERGENCY DEPT VISIT HI MDM: CPT

## 2023-05-16 PROCEDURE — 71045 X-RAY EXAM CHEST 1 VIEW: CPT | Mod: 26

## 2023-05-16 PROCEDURE — 70450 CT HEAD/BRAIN W/O DYE: CPT | Mod: 26,MA

## 2023-05-16 PROCEDURE — 99283 EMERGENCY DEPT VISIT LOW MDM: CPT

## 2023-05-16 RX ORDER — SODIUM CHLORIDE 9 MG/ML
1000 INJECTION INTRAMUSCULAR; INTRAVENOUS; SUBCUTANEOUS ONCE
Refills: 0 | Status: COMPLETED | OUTPATIENT
Start: 2023-05-16 | End: 2023-05-16

## 2023-05-16 RX ORDER — HALOPERIDOL DECANOATE 100 MG/ML
5 INJECTION INTRAMUSCULAR ONCE
Refills: 0 | Status: COMPLETED | OUTPATIENT
Start: 2023-05-16 | End: 2023-05-16

## 2023-05-16 RX ORDER — SODIUM CHLORIDE 9 MG/ML
1000 INJECTION INTRAMUSCULAR; INTRAVENOUS; SUBCUTANEOUS
Refills: 0 | Status: DISCONTINUED | OUTPATIENT
Start: 2023-05-16 | End: 2023-05-20

## 2023-05-16 RX ADMIN — HALOPERIDOL DECANOATE 5 MILLIGRAM(S): 100 INJECTION INTRAMUSCULAR at 19:38

## 2023-05-16 RX ADMIN — SODIUM CHLORIDE 1000 MILLILITER(S): 9 INJECTION INTRAMUSCULAR; INTRAVENOUS; SUBCUTANEOUS at 21:53

## 2023-05-16 RX ADMIN — Medication 2 MILLIGRAM(S): at 19:38

## 2023-05-16 RX ADMIN — SODIUM CHLORIDE 1000 MILLILITER(S): 9 INJECTION INTRAMUSCULAR; INTRAVENOUS; SUBCUTANEOUS at 22:42

## 2023-05-16 NOTE — ED PROVIDER NOTE - PATIENT PORTAL LINK FT
You can access the FollowMyHealth Patient Portal offered by [facility] by registering at the following website: [email or web address]. By joining [facility]’s FollowMyHealth portal, you will also be able to view your health information using other applications (apps) compatible with our system.

## 2023-05-16 NOTE — ED PROVIDER NOTE - NSFOLLOWUPINSTRUCTIONS_ED_ALL_ED_FT
There were no signs of an emergency medical condition on completion of today's workup.  You will need further medical care and evaluation. A presumptive diagnosis has been made, however further evaluation may be required by your primary care doctor or specialist for a definitive diagnosis.      Follow up with your medical doctor in 2-3 days or call our clinic at [phone] and state you were seen in the Emergency Department and would like to be seen in clinic. You may also call [phone] to speak with a representative to assist follow up care with medicine, surgery, or specialists.    If you are having pain, take Tylenol/acetaminophen 1 g every six hours and supplement (if allowed by your physician, and if you're not having gastric/gastrointestinal/stomach/intestinal problems) with ibuprofen 600 mg, with food or milk/maalox, every six hours which can be taken three hours apart from the Tylenol to have a layered effect.     Be sure to take no more than 4000mg or 4g of Tylenol/acetaminophen in a 24 hour period. Be sure to check your other medications to see if they include Tylenol/acetaminophen and include them in your calculations to ensure you do not take more than 4000mg or 4g of Tylenol/acetaminophen a day.    Drink at least 2 Liters or 64 Ounces of water each day (UNLESS you are supposed to restrict fluids or have a history of congestive heart failure (CHF)).    Return for any persistent, worsening symptoms, or ANY concerns at all.

## 2023-05-16 NOTE — ED PROVIDER NOTE - CLINICAL SUMMARY MEDICAL DECISION MAKING FREE TEXT BOX
Patient is a 67 year old male with past medical history significant for schizophrenia presenting to the Emergency Department with chief complaint of psychiatric evaluation needed.  Patient received psychiatric evaluation.  Patient's facility and psychiatry were spoken to.  Patient's valproic acid level was increased to 1g BID in agreement with psychiatry and facility.  Patient reports improvement of symptoms at this time.  Patient verbalizes understanding of all testing performed throughout their stay.  Patient agrees with plan for discharge and outpatient follow-up with psychiatry.

## 2023-05-16 NOTE — ED PROVIDER NOTE - IV ALTEPLASE EXCL ABS HIDDEN
Mother returned call to clinic and informed that Dr Nobles has reached out to the endocrine department and awaiting a call back. Mother verbalized understanding.   show
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
## 2023-05-17 VITALS
HEART RATE: 75 BPM | OXYGEN SATURATION: 95 % | SYSTOLIC BLOOD PRESSURE: 117 MMHG | DIASTOLIC BLOOD PRESSURE: 72 MMHG | RESPIRATION RATE: 16 BRPM | TEMPERATURE: 98 F

## 2023-05-17 DIAGNOSIS — F20.9 SCHIZOPHRENIA, UNSPECIFIED: ICD-10-CM

## 2023-05-17 LAB
AMPHET UR-MCNC: NEGATIVE — SIGNIFICANT CHANGE UP
APPEARANCE UR: CLEAR — SIGNIFICANT CHANGE UP
BARBITURATES UR SCN-MCNC: NEGATIVE — SIGNIFICANT CHANGE UP
BENZODIAZ UR-MCNC: NEGATIVE — SIGNIFICANT CHANGE UP
BILIRUB UR-MCNC: NEGATIVE — SIGNIFICANT CHANGE UP
COCAINE METAB.OTHER UR-MCNC: NEGATIVE — SIGNIFICANT CHANGE UP
COLOR SPEC: YELLOW — SIGNIFICANT CHANGE UP
DIFF PNL FLD: NEGATIVE — SIGNIFICANT CHANGE UP
GLUCOSE UR QL: NEGATIVE — SIGNIFICANT CHANGE UP
KETONES UR-MCNC: NEGATIVE — SIGNIFICANT CHANGE UP
LEUKOCYTE ESTERASE UR-ACNC: NEGATIVE — SIGNIFICANT CHANGE UP
METHADONE UR-MCNC: NEGATIVE — SIGNIFICANT CHANGE UP
NITRITE UR-MCNC: NEGATIVE — SIGNIFICANT CHANGE UP
OPIATES UR-MCNC: NEGATIVE — SIGNIFICANT CHANGE UP
OXYCODONE UR-MCNC: NEGATIVE — SIGNIFICANT CHANGE UP
PCP SPEC-MCNC: SIGNIFICANT CHANGE UP
PCP UR-MCNC: NEGATIVE — SIGNIFICANT CHANGE UP
PH UR: 6.5 — SIGNIFICANT CHANGE UP (ref 5–8)
PROT UR-MCNC: SIGNIFICANT CHANGE UP
SP GR SPEC: 1.02 — SIGNIFICANT CHANGE UP (ref 1.01–1.02)
THC UR QL: NEGATIVE — SIGNIFICANT CHANGE UP
UROBILINOGEN FLD QL: NEGATIVE — SIGNIFICANT CHANGE UP
VALPROATE SERPL-MCNC: 17 UG/ML — LOW (ref 50–100)

## 2023-05-17 PROCEDURE — 99285 EMERGENCY DEPT VISIT HI MDM: CPT | Mod: 25

## 2023-05-17 PROCEDURE — 87086 URINE CULTURE/COLONY COUNT: CPT

## 2023-05-17 PROCEDURE — 36415 COLL VENOUS BLD VENIPUNCTURE: CPT

## 2023-05-17 PROCEDURE — 71045 X-RAY EXAM CHEST 1 VIEW: CPT

## 2023-05-17 PROCEDURE — 96360 HYDRATION IV INFUSION INIT: CPT

## 2023-05-17 PROCEDURE — 80307 DRUG TEST PRSMV CHEM ANLYZR: CPT

## 2023-05-17 PROCEDURE — 70450 CT HEAD/BRAIN W/O DYE: CPT | Mod: MA

## 2023-05-17 PROCEDURE — 84443 ASSAY THYROID STIM HORMONE: CPT

## 2023-05-17 PROCEDURE — 80164 ASSAY DIPROPYLACETIC ACD TOT: CPT

## 2023-05-17 PROCEDURE — 96372 THER/PROPH/DIAG INJ SC/IM: CPT | Mod: XU

## 2023-05-17 PROCEDURE — 0225U NFCT DS DNA&RNA 21 SARSCOV2: CPT

## 2023-05-17 PROCEDURE — 81003 URINALYSIS AUTO W/O SCOPE: CPT

## 2023-05-17 PROCEDURE — 85025 COMPLETE CBC W/AUTO DIFF WBC: CPT

## 2023-05-17 PROCEDURE — 80053 COMPREHEN METABOLIC PANEL: CPT

## 2023-05-17 PROCEDURE — 93005 ELECTROCARDIOGRAM TRACING: CPT

## 2023-05-17 RX ORDER — SODIUM CHLORIDE 9 MG/ML
1000 INJECTION INTRAMUSCULAR; INTRAVENOUS; SUBCUTANEOUS ONCE
Refills: 0 | Status: COMPLETED | OUTPATIENT
Start: 2023-05-17 | End: 2023-05-17

## 2023-05-17 RX ORDER — OLANZAPINE 15 MG/1
7.5 TABLET, FILM COATED ORAL ONCE
Refills: 0 | Status: COMPLETED | OUTPATIENT
Start: 2023-05-17 | End: 2023-05-17

## 2023-05-17 RX ORDER — VALPROIC ACID (AS SODIUM SALT) 250 MG/5ML
1000 SOLUTION, ORAL ORAL ONCE
Refills: 0 | Status: COMPLETED | OUTPATIENT
Start: 2023-05-17 | End: 2023-05-17

## 2023-05-17 RX ADMIN — Medication 1000 MILLIGRAM(S): at 10:20

## 2023-05-17 RX ADMIN — SODIUM CHLORIDE 75 MILLILITER(S): 9 INJECTION INTRAMUSCULAR; INTRAVENOUS; SUBCUTANEOUS at 00:11

## 2023-05-17 RX ADMIN — OLANZAPINE 7.5 MILLIGRAM(S): 15 TABLET, FILM COATED ORAL at 10:19
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
## 2023-05-17 NOTE — ED BEHAVIORAL HEALTH ASSESSMENT NOTE - SAFETY PLAN ADDT'L DETAILS
Safety plan discussed with.../Education provided regarding environmental safety / lethal means restriction/Provision of National Suicide Prevention Lifeline 9-169-572-BLZO (0548)

## 2023-05-17 NOTE — ED BEHAVIORAL HEALTH ASSESSMENT NOTE - DOMICILED WITH
"Routing refill request to provider for review/approval because:  Labs out of range:  a1c    Last Written Prescription Date:  8/31/22  Last Fill Quantity: 30,  # refills: 1   Last office visit provider:  5/31/22     Requested Prescriptions   Pending Prescriptions Disp Refills     JANUVIA 100 MG tablet [Pharmacy Med Name: JANUVIA 100 MG TABS 100 Tablet] 30 tablet 1     Sig: TAKE 1 TABLET DAILY FOR DIABETES // 1 HNUB NOJ 1 LUB PAB BEATRIZ NTSHAV QAB ZIB       DPP4 Inhibitors Protocol Failed - 10/24/2022  3:57 PM        Failed - HgbA1C in past 3 or 6 months     If HgbA1C is 8 or greater, it needs to be on file within the past 3 months.  If less than 8, must be on file within the past 6 months.     Recent Labs   Lab Test 04/05/22  1721   A1C 6.3*             Passed - Medication is active on med list        Passed - Patient is age 18 or older        Passed - Normal serum creatinine in past 12 months     Recent Labs   Lab Test 10/24/22  1235   CR 1.09       Ok to refill medication if creatinine is low          Passed - Recent (6 mo) or future (30 days) visit within the authorizing provider's specialty     Patient had office visit in the last 6 months or has a visit in the next 30 days with authorizing provider.  See \"Patient Info\" tab in inbasket, or \"Choose Columns\" in Meds & Orders section of the refill encounter.                 Romina Fu RN 10/25/22 3:03 PM  "
Other

## 2023-05-17 NOTE — ED BEHAVIORAL HEALTH ASSESSMENT NOTE - MEDICATIONS (PRESCRIPTIONS, DIRECTIONS)
Pending Depakote level, if room to go up, would increase regimen to Depakote 1000mg BID as long as LFTs okay; C/w zyprexa 7.5mg QAM & 5mg QHS

## 2023-05-17 NOTE — ED BEHAVIORAL HEALTH ASSESSMENT NOTE - HPI (INCLUDE ILLNESS QUALITY, SEVERITY, DURATION, TIMING, CONTEXT, MODIFYING FACTORS, ASSOCIATED SIGNS AND SYMPTOMS)
This is a 66 y/o M, domiciled at [facility], disabled, reported pphx of schizophrenia, remote hx of inpatient hospitalizations, no known SIB/SA and no known pmhx who was BIBEMS from NH last night for reportedly being aggressive toward staff.     Psychiatry consulted for evaluation of aggressive behavior.     Patient seen and evaluated with attending. He is seated in stretcher, awaken and alert, oriented to self, loosely to location and year. He is vague, relatively poor historian. He is able to answer short and simple questions directly. Does not recall becoming agitated at his facility or why he was sent here. He initially says that there are no issues there and is fine to return. He denies depressed mood or any neurovegetative sxs of depression including sleep disturbances, poor appetite, impaired concentration. Denies SI. Denies HI. Denies manic and psychotic sxs. He does confirm that he has a history of schizophrenia and has been psychiatrically hospitalized in the past for "paranoia" but currently denies feeling that way now.   Later on in interview he starts to make statements he needs to get to "downtown [facility]" and will not return to the facility "you can't make me go back". However with explaining to patient that he currently does not appear to require psychiatric admission and his only option would be to return to the facility, he is amenable to going back and responded fine to verbal redirection without patient escalating.    Called patient's NH at [place] [phone], only a  will answer and then does not connect you to anyone for further information regarding patient

## 2023-05-17 NOTE — ED BEHAVIORAL HEALTH ASSESSMENT NOTE - RISK ASSESSMENT
Risk: Hx of psychotic d/o, psychiatric hospital admission  Protective: Medication compliant, showing self control, residential stability,

## 2023-05-17 NOTE — ED BEHAVIORAL HEALTH ASSESSMENT NOTE - DESCRIPTION
Patient brought in last night, received IM haldol 5mg and IM ativan 2mg Domiciled at [facility], disabled Denies

## 2023-05-17 NOTE — ED BEHAVIORAL HEALTH ASSESSMENT NOTE - SUMMARY
This is a 68 y/o M, domiciled at [facility], disabled, reported pphx of schizophrenia, remote hx of inpatient hospitalizations, no known SIB/SA and no known pmhx who was BIBEMS from NH last night for reportedly being aggressive toward staff.     Psychiatry consulted for evaluation of aggressive behavior.     Patient seen and evaluated with attending. He is seated in stretcher, awaken and alert, oriented to self, loosely to location and year. He is vague, relatively poor historian. He is able to answer short and simple questions directly. Does not recall becoming agitated at his facility or why he was sent here. He initially says that there are no issues there and is fine to return. He denies depressed mood or any neurovegetative sxs of depression including sleep disturbances, poor appetite, impaired concentration. Denies SI. Denies HI. Denies manic and psychotic sxs. He does confirm that he has a history of schizophrenia and has been psychiatrically hospitalized in the past for "paranoia" but currently denies feeling that way now.   Later on in interview he starts to make statements he needs to get to "downtown [facility]" and will not return to the facility "you can't make me go back". However with explaining to patient that he currently does not appear to require psychiatric admission and his only option would be to return to the facility, he is amenable to going back and responded fine to verbal redirection without patient escalating.    Patient currently mildly irritable but is redirectable. Received IM Haldol 5mg & Ativan 2mg upon arrival to [facility] yesterday evening but has not received any medication since then. Given that he is currently calm, able to stay in control when becoming upset, denying any acute psychiatric safety concerns including SI/HI/AVH and is maintaining functionality (e.g. eating, sleeping, has some assistance w/ ADLs), he is not meeting criteria for involuntary psychiatric admission.  Patient is appropriate to return to facility pending medical clearance    Recommendations:  C/w CO while in the ED   EKG to monitor QTc, if > 500 HOLD antipsychotics   obtain depakote level --> If level has room to incerase regimen, would go up to 1000mg BID;  If unable to increase depakote regimen, would increase zyprexa to 7.5m PO BID   C/w zyprexa 7.5mg QAM & 5mg QHS     Does not require psychiatric inpatient hospitalization, can return to facility pending medical clearance

## 2023-05-17 NOTE — ED ADULT NURSE REASSESSMENT NOTE - NS ED NURSE REASSESS COMMENT FT1
as per MD Conner OK for patient to remain with his belonging and not to be removed by security. Charge RN and ANM made aware.

## 2023-05-17 NOTE — ED BEHAVIORAL HEALTH ASSESSMENT NOTE - NSBHATTESTCOMMENTATTENDFT_PSY_A_CORE
Pt is a 68 y/o SWM with hx of schizophrenia, left leg amputee, resides in nursing facility, was bib EMS last night for worsening agitation. pt seen, AOA x 3, reports frustration over recent stay in the nursing home, however has been compliant with his psychotropic medications, with recent changes for behavioral issues. pt denies si/hi, reports fair sleep and appetite, no current manic or psychotic symptoms present, and pt amenable to go back to facility. agree with psychiatric fellow's A/P above, if LFTs and VPA level not too high, may consider further adjustments to raise to 1000 mg bid for mood stability and agitation. per ER attending, zyprexa recently adjusted. no current indication for inpt psychiatric admission.

## 2023-05-18 LAB
CULTURE RESULTS: SIGNIFICANT CHANGE UP
SPECIMEN SOURCE: SIGNIFICANT CHANGE UP

## 2023-05-20 LAB — DRUG SCREEN, SERUM: SIGNIFICANT CHANGE UP

## 2023-09-20 NOTE — ED PROVIDER NOTE - CPE EDP CARDIAC NORM
- - - Cheek Interpolation Flap Text: A decision was made to reconstruct the defect utilizing an interpolation axial flap and a staged reconstruction.  A telfa template was made of the defect.  This telfa template was then used to outline the Cheek Interpolation flap.  The donor area for the pedicle flap was then injected with anesthesia.  The flap was excised through the skin and subcutaneous tissue down to the layer of the underlying musculature.  The interpolation flap was carefully excised within this deep plane to maintain its blood supply.  The edges of the donor site were undermined.   The donor site was closed in a primary fashion.  The pedicle was then rotated into position and sutured.  Once the tube was sutured into place, adequate blood supply was confirmed with blanching and refill.  The pedicle was then wrapped with xeroform gauze and dressed appropriately with a telfa and gauze bandage to ensure continued blood supply and protect the attached pedicle.

## 2023-11-23 NOTE — ED PROVIDER NOTE - CLINICAL SUMMARY MEDICAL DECISION MAKING FREE TEXT BOX
will provide motrin/tylenol for pain. AKA on the L knee does not seem to be infected. It's healing appropriately. History/Exam/Medical Decision Making

## 2024-04-16 NOTE — ED ADULT NURSE NOTE - CAS TRG GEN SKIN COLOR
Normal for race Patient stable, pain controlled 3/10. Report called to nurse on 4N, transport arranged.

## 2024-04-26 NOTE — ED PROVIDER NOTE - RESPIRATORY NEGATIVE STATEMENT, MLM
STD forms received via [employer] from [employer].      Forms to be completed and put in folder for provider to approve.    Fax #:  [phone]  Claim: [id number]    Shruthi Cruz     no chest pain, no cough, and no shortness of breath.

## 2024-08-24 NOTE — ED PROVIDER NOTE - CROS ED RESP ALL NEG
Patient seen in  8/24. Cc of dysuria. Sent home on Bactrim. Urine culture pending.     CBC and BMP resulted.    Please advise.   
- - -

## 2025-07-24 NOTE — ED PROVIDER NOTE - NSDCPRINTRESULTS_ED_ALL_ED
PA requested for Atogepant (Qulipta) 60 MG TABS scanned into media. Office visit dated 7/22/25 is unsigned. Please have provider complete and sign encounter. Once completed PA can be submitted for Qulipta 60 mg   Patient requests all Lab, Cardiology, and Radiology Results on their Discharge Instructions